# Patient Record
Sex: MALE | Race: WHITE | NOT HISPANIC OR LATINO | Employment: UNEMPLOYED | URBAN - METROPOLITAN AREA
[De-identification: names, ages, dates, MRNs, and addresses within clinical notes are randomized per-mention and may not be internally consistent; named-entity substitution may affect disease eponyms.]

---
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
## 2022-02-14 ENCOUNTER — PATIENT MESSAGE (OUTPATIENT)
Dept: RESEARCH | Facility: HOSPITAL | Age: 22
End: 2022-02-14
Payer: COMMERCIAL

## 2022-02-16 ENCOUNTER — PATIENT MESSAGE (OUTPATIENT)
Dept: RESEARCH | Facility: HOSPITAL | Age: 22
End: 2022-02-16
Payer: COMMERCIAL

## 2022-02-23 ENCOUNTER — HOSPITAL ENCOUNTER (INPATIENT)
Facility: HOSPITAL | Age: 22
LOS: 4 days | Discharge: HOME | DRG: 641 | End: 2022-02-27
Attending: EMERGENCY MEDICINE | Admitting: HOSPITALIST
Payer: COMMERCIAL

## 2022-02-23 DIAGNOSIS — R07.89 SENSATION OF CHEST TIGHTNESS: ICD-10-CM

## 2022-02-23 DIAGNOSIS — K92.1 BLOOD IN STOOL: ICD-10-CM

## 2022-02-23 DIAGNOSIS — R11.15 CYCLIC VOMITING SYNDROME: ICD-10-CM

## 2022-02-23 DIAGNOSIS — R00.0 TACHYCARDIA: ICD-10-CM

## 2022-02-23 DIAGNOSIS — R94.31 PROLONGED Q-T INTERVAL ON ECG: ICD-10-CM

## 2022-02-23 DIAGNOSIS — R11.10 VOMITING: ICD-10-CM

## 2022-02-23 DIAGNOSIS — R42 DIZZINESS: ICD-10-CM

## 2022-02-23 DIAGNOSIS — E87.6 HYPOKALEMIA: ICD-10-CM

## 2022-02-23 DIAGNOSIS — R06.02 SHORTNESS OF BREATH: ICD-10-CM

## 2022-02-23 DIAGNOSIS — F41.9 ANXIETY: ICD-10-CM

## 2022-02-23 DIAGNOSIS — R11.2 NON-INTRACTABLE VOMITING WITH NAUSEA, UNSPECIFIED VOMITING TYPE: Primary | ICD-10-CM

## 2022-02-23 PROBLEM — R79.89 ELEVATED TROPONIN: Status: ACTIVE | Noted: 2022-02-23

## 2022-02-23 LAB
ALBUMIN SERPL BCP-MCNC: 4.7 G/DL (ref 3.5–5.2)
ALP SERPL-CCNC: 112 U/L (ref 55–135)
ALT SERPL W/O P-5'-P-CCNC: 37 U/L (ref 10–44)
AMPHET+METHAMPHET UR QL: NEGATIVE
ANION GAP SERPL CALC-SCNC: 16 MMOL/L (ref 8–16)
ANION GAP SERPL CALC-SCNC: 17 MMOL/L (ref 8–16)
AST SERPL-CCNC: 40 U/L (ref 10–40)
BACTERIA #/AREA URNS AUTO: NORMAL /HPF
BARBITURATES UR QL SCN>200 NG/ML: NEGATIVE
BASOPHILS # BLD AUTO: 0.07 K/UL (ref 0–0.2)
BASOPHILS NFR BLD: 0.4 % (ref 0–1.9)
BENZODIAZ UR QL SCN>200 NG/ML: NEGATIVE
BILIRUB SERPL-MCNC: 1.8 MG/DL (ref 0.1–1)
BILIRUB UR QL STRIP: NEGATIVE
BNP SERPL-MCNC: <10 PG/ML (ref 0–99)
BUN SERPL-MCNC: 26 MG/DL (ref 6–20)
BUN SERPL-MCNC: 30 MG/DL (ref 6–20)
BZE UR QL SCN: NEGATIVE
CALCIUM SERPL-MCNC: 9 MG/DL (ref 8.7–10.5)
CALCIUM SERPL-MCNC: 9.6 MG/DL (ref 8.7–10.5)
CANNABINOIDS UR QL SCN: ABNORMAL
CHLORIDE SERPL-SCNC: 71 MMOL/L (ref 95–110)
CHLORIDE SERPL-SCNC: 75 MMOL/L (ref 95–110)
CLARITY UR REFRACT.AUTO: CLEAR
CO2 SERPL-SCNC: 37 MMOL/L (ref 23–29)
CO2 SERPL-SCNC: 39 MMOL/L (ref 23–29)
COLOR UR AUTO: YELLOW
CREAT SERPL-MCNC: 1.3 MG/DL (ref 0.5–1.4)
CREAT SERPL-MCNC: 1.3 MG/DL (ref 0.5–1.4)
CREAT UR-MCNC: 183 MG/DL (ref 23–375)
CRP SERPL-MCNC: 13.2 MG/L (ref 0–8.2)
CTP QC/QA: YES
D DIMER PPP IA.FEU-MCNC: 0.32 MG/L FEU
DIFFERENTIAL METHOD: ABNORMAL
EOSINOPHIL # BLD AUTO: 0 K/UL (ref 0–0.5)
EOSINOPHIL NFR BLD: 0 % (ref 0–8)
ERYTHROCYTE [DISTWIDTH] IN BLOOD BY AUTOMATED COUNT: 12.5 % (ref 11.5–14.5)
EST. GFR  (AFRICAN AMERICAN): >60 ML/MIN/1.73 M^2
EST. GFR  (AFRICAN AMERICAN): >60 ML/MIN/1.73 M^2
EST. GFR  (NON AFRICAN AMERICAN): >60 ML/MIN/1.73 M^2
EST. GFR  (NON AFRICAN AMERICAN): >60 ML/MIN/1.73 M^2
GLUCOSE SERPL-MCNC: 124 MG/DL (ref 70–110)
GLUCOSE SERPL-MCNC: 125 MG/DL (ref 70–110)
GLUCOSE UR QL STRIP: NEGATIVE
HCT VFR BLD AUTO: 49.7 % (ref 40–54)
HGB BLD-MCNC: 17.6 G/DL (ref 14–18)
HGB UR QL STRIP: NEGATIVE
HYALINE CASTS UR QL AUTO: 0 /LPF
IMM GRANULOCYTES # BLD AUTO: 0.19 K/UL (ref 0–0.04)
IMM GRANULOCYTES NFR BLD AUTO: 1 % (ref 0–0.5)
KETONES UR QL STRIP: NEGATIVE
LEUKOCYTE ESTERASE UR QL STRIP: NEGATIVE
LIPASE SERPL-CCNC: 19 U/L (ref 4–60)
LYMPHOCYTES # BLD AUTO: 1.8 K/UL (ref 1–4.8)
LYMPHOCYTES NFR BLD: 9.9 % (ref 18–48)
MAGNESIUM SERPL-MCNC: 3.3 MG/DL (ref 1.6–2.6)
MCH RBC QN AUTO: 29.5 PG (ref 27–31)
MCHC RBC AUTO-ENTMCNC: 35.4 G/DL (ref 32–36)
MCV RBC AUTO: 83 FL (ref 82–98)
METHADONE UR QL SCN>300 NG/ML: NEGATIVE
MICROSCOPIC COMMENT: NORMAL
MONOCYTES # BLD AUTO: 2 K/UL (ref 0.3–1)
MONOCYTES NFR BLD: 10.6 % (ref 4–15)
NEUTROPHILS # BLD AUTO: 14.5 K/UL (ref 1.8–7.7)
NEUTROPHILS NFR BLD: 78.1 % (ref 38–73)
NITRITE UR QL STRIP: NEGATIVE
NRBC BLD-RTO: 0 /100 WBC
OPIATES UR QL SCN: NEGATIVE
PCP UR QL SCN>25 NG/ML: NEGATIVE
PH UR STRIP: 7 [PH] (ref 5–8)
PLATELET # BLD AUTO: 399 K/UL (ref 150–450)
PMV BLD AUTO: 11.1 FL (ref 9.2–12.9)
POCT GLUCOSE: 130 MG/DL (ref 70–110)
POTASSIUM SERPL-SCNC: 2.2 MMOL/L (ref 3.5–5.1)
POTASSIUM SERPL-SCNC: 2.2 MMOL/L (ref 3.5–5.1)
PROT SERPL-MCNC: 8.1 G/DL (ref 6–8.4)
PROT UR QL STRIP: ABNORMAL
RBC # BLD AUTO: 5.97 M/UL (ref 4.6–6.2)
RBC #/AREA URNS AUTO: 2 /HPF (ref 0–4)
SARS-COV-2 RDRP RESP QL NAA+PROBE: NEGATIVE
SODIUM SERPL-SCNC: 126 MMOL/L (ref 136–145)
SODIUM SERPL-SCNC: 129 MMOL/L (ref 136–145)
SP GR UR STRIP: 1.02 (ref 1–1.03)
TOXICOLOGY INFORMATION: ABNORMAL
TROPONIN I SERPL DL<=0.01 NG/ML-MCNC: 0.26 NG/ML (ref 0–0.03)
TROPONIN I SERPL DL<=0.01 NG/ML-MCNC: 0.28 NG/ML (ref 0–0.03)
TROPONIN I SERPL DL<=0.01 NG/ML-MCNC: 0.33 NG/ML (ref 0–0.03)
TSH SERPL DL<=0.005 MIU/L-ACNC: 0.67 UIU/ML (ref 0.4–4)
URN SPEC COLLECT METH UR: ABNORMAL
WBC # BLD AUTO: 18.55 K/UL (ref 3.9–12.7)
WBC #/AREA URNS AUTO: 1 /HPF (ref 0–5)

## 2022-02-23 PROCEDURE — 86140 C-REACTIVE PROTEIN: CPT | Performed by: EMERGENCY MEDICINE

## 2022-02-23 PROCEDURE — 96366 THER/PROPH/DIAG IV INF ADDON: CPT

## 2022-02-23 PROCEDURE — 80048 BASIC METABOLIC PNL TOTAL CA: CPT | Performed by: HOSPITALIST

## 2022-02-23 PROCEDURE — 93010 EKG 12-LEAD: ICD-10-PCS | Mod: ,,, | Performed by: INTERNAL MEDICINE

## 2022-02-23 PROCEDURE — 63600175 PHARM REV CODE 636 W HCPCS: Performed by: EMERGENCY MEDICINE

## 2022-02-23 PROCEDURE — 93010 ELECTROCARDIOGRAM REPORT: CPT | Mod: ,,, | Performed by: INTERNAL MEDICINE

## 2022-02-23 PROCEDURE — 96375 TX/PRO/DX INJ NEW DRUG ADDON: CPT

## 2022-02-23 PROCEDURE — 99285 EMERGENCY DEPT VISIT HI MDM: CPT | Mod: 25

## 2022-02-23 PROCEDURE — 85025 COMPLETE CBC W/AUTO DIFF WBC: CPT | Performed by: EMERGENCY MEDICINE

## 2022-02-23 PROCEDURE — 99284 EMERGENCY DEPT VISIT MOD MDM: CPT | Mod: CS,,, | Performed by: EMERGENCY MEDICINE

## 2022-02-23 PROCEDURE — 84484 ASSAY OF TROPONIN QUANT: CPT | Performed by: EMERGENCY MEDICINE

## 2022-02-23 PROCEDURE — 96361 HYDRATE IV INFUSION ADD-ON: CPT | Mod: 59

## 2022-02-23 PROCEDURE — 99223 PR INITIAL HOSPITAL CARE,LEVL III: ICD-10-PCS | Mod: ,,, | Performed by: HOSPITALIST

## 2022-02-23 PROCEDURE — 83735 ASSAY OF MAGNESIUM: CPT | Performed by: HOSPITALIST

## 2022-02-23 PROCEDURE — 96374 THER/PROPH/DIAG INJ IV PUSH: CPT | Mod: 59

## 2022-02-23 PROCEDURE — 84443 ASSAY THYROID STIM HORMONE: CPT | Performed by: EMERGENCY MEDICINE

## 2022-02-23 PROCEDURE — 80307 DRUG TEST PRSMV CHEM ANLYZR: CPT | Performed by: EMERGENCY MEDICINE

## 2022-02-23 PROCEDURE — 12000002 HC ACUTE/MED SURGE SEMI-PRIVATE ROOM

## 2022-02-23 PROCEDURE — 99223 1ST HOSP IP/OBS HIGH 75: CPT | Mod: ,,, | Performed by: HOSPITALIST

## 2022-02-23 PROCEDURE — 99284 PR EMERGENCY DEPT VISIT,LEVEL IV: ICD-10-PCS | Mod: CS,,, | Performed by: EMERGENCY MEDICINE

## 2022-02-23 PROCEDURE — 84484 ASSAY OF TROPONIN QUANT: CPT | Mod: 91 | Performed by: EMERGENCY MEDICINE

## 2022-02-23 PROCEDURE — 96365 THER/PROPH/DIAG IV INF INIT: CPT

## 2022-02-23 PROCEDURE — 85379 FIBRIN DEGRADATION QUANT: CPT | Performed by: EMERGENCY MEDICINE

## 2022-02-23 PROCEDURE — 83690 ASSAY OF LIPASE: CPT | Performed by: EMERGENCY MEDICINE

## 2022-02-23 PROCEDURE — 84484 ASSAY OF TROPONIN QUANT: CPT | Mod: 91 | Performed by: HOSPITALIST

## 2022-02-23 PROCEDURE — 83880 ASSAY OF NATRIURETIC PEPTIDE: CPT | Performed by: EMERGENCY MEDICINE

## 2022-02-23 PROCEDURE — U0002 COVID-19 LAB TEST NON-CDC: HCPCS | Performed by: STUDENT IN AN ORGANIZED HEALTH CARE EDUCATION/TRAINING PROGRAM

## 2022-02-23 PROCEDURE — 93005 ELECTROCARDIOGRAM TRACING: CPT

## 2022-02-23 PROCEDURE — 80053 COMPREHEN METABOLIC PANEL: CPT | Performed by: EMERGENCY MEDICINE

## 2022-02-23 PROCEDURE — 82962 GLUCOSE BLOOD TEST: CPT

## 2022-02-23 PROCEDURE — 25000003 PHARM REV CODE 250: Performed by: EMERGENCY MEDICINE

## 2022-02-23 PROCEDURE — 63600175 PHARM REV CODE 636 W HCPCS: Performed by: HOSPITALIST

## 2022-02-23 PROCEDURE — 81001 URINALYSIS AUTO W/SCOPE: CPT | Performed by: EMERGENCY MEDICINE

## 2022-02-23 RX ORDER — IBUPROFEN 200 MG
24 TABLET ORAL
Status: DISCONTINUED | OUTPATIENT
Start: 2022-02-23 | End: 2022-02-27 | Stop reason: HOSPADM

## 2022-02-23 RX ORDER — IPRATROPIUM BROMIDE AND ALBUTEROL SULFATE 2.5; .5 MG/3ML; MG/3ML
3 SOLUTION RESPIRATORY (INHALATION) EVERY 6 HOURS PRN
Status: DISCONTINUED | OUTPATIENT
Start: 2022-02-23 | End: 2022-02-23

## 2022-02-23 RX ORDER — LORAZEPAM 2 MG/ML
1 INJECTION INTRAMUSCULAR ONCE
Status: COMPLETED | OUTPATIENT
Start: 2022-02-23 | End: 2022-02-23

## 2022-02-23 RX ORDER — MAG HYDROX/ALUMINUM HYD/SIMETH 200-200-20
30 SUSPENSION, ORAL (FINAL DOSE FORM) ORAL ONCE
Status: COMPLETED | OUTPATIENT
Start: 2022-02-23 | End: 2022-02-23

## 2022-02-23 RX ORDER — POTASSIUM CHLORIDE 750 MG/1
40 CAPSULE, EXTENDED RELEASE ORAL ONCE
Status: COMPLETED | OUTPATIENT
Start: 2022-02-24 | End: 2022-02-23

## 2022-02-23 RX ORDER — ACETAMINOPHEN 325 MG/1
650 TABLET ORAL EVERY 4 HOURS PRN
Status: DISCONTINUED | OUTPATIENT
Start: 2022-02-23 | End: 2022-02-27 | Stop reason: HOSPADM

## 2022-02-23 RX ORDER — IPRATROPIUM BROMIDE AND ALBUTEROL SULFATE 2.5; .5 MG/3ML; MG/3ML
3 SOLUTION RESPIRATORY (INHALATION) EVERY 6 HOURS PRN
Status: DISCONTINUED | OUTPATIENT
Start: 2022-02-23 | End: 2022-02-27 | Stop reason: HOSPADM

## 2022-02-23 RX ORDER — PROCHLORPERAZINE EDISYLATE 5 MG/ML
5 INJECTION INTRAMUSCULAR; INTRAVENOUS EVERY 6 HOURS PRN
Status: DISCONTINUED | OUTPATIENT
Start: 2022-02-23 | End: 2022-02-23

## 2022-02-23 RX ORDER — IBUPROFEN 200 MG
16 TABLET ORAL
Status: DISCONTINUED | OUTPATIENT
Start: 2022-02-23 | End: 2022-02-23

## 2022-02-23 RX ORDER — NALOXONE HCL 0.4 MG/ML
0.02 VIAL (ML) INJECTION
Status: DISCONTINUED | OUTPATIENT
Start: 2022-02-23 | End: 2022-02-27 | Stop reason: HOSPADM

## 2022-02-23 RX ORDER — SODIUM CHLORIDE 0.9 % (FLUSH) 0.9 %
10 SYRINGE (ML) INJECTION
Status: DISCONTINUED | OUTPATIENT
Start: 2022-02-23 | End: 2022-02-23

## 2022-02-23 RX ORDER — POTASSIUM CHLORIDE 7.45 MG/ML
10 INJECTION INTRAVENOUS
Status: COMPLETED | OUTPATIENT
Start: 2022-02-23 | End: 2022-02-23

## 2022-02-23 RX ORDER — POTASSIUM CHLORIDE 7.45 MG/ML
10 INJECTION INTRAVENOUS ONCE
Status: DISCONTINUED | OUTPATIENT
Start: 2022-02-23 | End: 2022-02-23

## 2022-02-23 RX ORDER — SODIUM CHLORIDE 9 MG/ML
INJECTION, SOLUTION INTRAVENOUS CONTINUOUS
Status: DISCONTINUED | OUTPATIENT
Start: 2022-02-24 | End: 2022-02-24

## 2022-02-23 RX ORDER — SODIUM CHLORIDE AND POTASSIUM CHLORIDE 150; 900 MG/100ML; MG/100ML
INJECTION, SOLUTION INTRAVENOUS CONTINUOUS
Status: DISCONTINUED | OUTPATIENT
Start: 2022-02-23 | End: 2022-02-23

## 2022-02-23 RX ORDER — TALC
6 POWDER (GRAM) TOPICAL NIGHTLY PRN
Status: DISCONTINUED | OUTPATIENT
Start: 2022-02-23 | End: 2022-02-23

## 2022-02-23 RX ORDER — LORAZEPAM 2 MG/ML
1 INJECTION INTRAMUSCULAR
Status: COMPLETED | OUTPATIENT
Start: 2022-02-23 | End: 2022-02-23

## 2022-02-23 RX ORDER — FAMOTIDINE 10 MG/ML
20 INJECTION INTRAVENOUS
Status: COMPLETED | OUTPATIENT
Start: 2022-02-23 | End: 2022-02-23

## 2022-02-23 RX ORDER — QUETIAPINE FUMARATE 200 MG/1
200 TABLET, FILM COATED ORAL NIGHTLY
COMMUNITY

## 2022-02-23 RX ORDER — ACETAMINOPHEN 325 MG/1
650 TABLET ORAL EVERY 4 HOURS PRN
Status: DISCONTINUED | OUTPATIENT
Start: 2022-02-23 | End: 2022-02-23

## 2022-02-23 RX ORDER — ENOXAPARIN SODIUM 100 MG/ML
40 INJECTION SUBCUTANEOUS EVERY 24 HOURS
Status: DISCONTINUED | OUTPATIENT
Start: 2022-02-23 | End: 2022-02-25

## 2022-02-23 RX ORDER — IBUPROFEN 200 MG
16 TABLET ORAL
Status: DISCONTINUED | OUTPATIENT
Start: 2022-02-23 | End: 2022-02-27 | Stop reason: HOSPADM

## 2022-02-23 RX ORDER — NALOXONE HCL 0.4 MG/ML
0.02 VIAL (ML) INJECTION
Status: DISCONTINUED | OUTPATIENT
Start: 2022-02-23 | End: 2022-02-23

## 2022-02-23 RX ORDER — TALC
6 POWDER (GRAM) TOPICAL NIGHTLY PRN
Status: DISCONTINUED | OUTPATIENT
Start: 2022-02-23 | End: 2022-02-27 | Stop reason: HOSPADM

## 2022-02-23 RX ORDER — IBUPROFEN 200 MG
24 TABLET ORAL
Status: DISCONTINUED | OUTPATIENT
Start: 2022-02-23 | End: 2022-02-23

## 2022-02-23 RX ORDER — MAGNESIUM SULFATE HEPTAHYDRATE 40 MG/ML
2 INJECTION, SOLUTION INTRAVENOUS
Status: COMPLETED | OUTPATIENT
Start: 2022-02-23 | End: 2022-02-23

## 2022-02-23 RX ORDER — SERTRALINE HYDROCHLORIDE 100 MG/1
100 TABLET, FILM COATED ORAL DAILY
COMMUNITY

## 2022-02-23 RX ORDER — POTASSIUM CHLORIDE 7.45 MG/ML
10 INJECTION INTRAVENOUS
Status: DISPENSED | OUTPATIENT
Start: 2022-02-23 | End: 2022-02-24

## 2022-02-23 RX ORDER — SODIUM CHLORIDE 0.9 % (FLUSH) 0.9 %
10 SYRINGE (ML) INJECTION
Status: DISCONTINUED | OUTPATIENT
Start: 2022-02-23 | End: 2022-02-27 | Stop reason: HOSPADM

## 2022-02-23 RX ORDER — ONDANSETRON 2 MG/ML
4 INJECTION INTRAMUSCULAR; INTRAVENOUS EVERY 8 HOURS PRN
Status: DISCONTINUED | OUTPATIENT
Start: 2022-02-23 | End: 2022-02-23

## 2022-02-23 RX ORDER — SERTRALINE HYDROCHLORIDE 100 MG/1
100 TABLET, FILM COATED ORAL DAILY
Status: DISCONTINUED | OUTPATIENT
Start: 2022-02-24 | End: 2022-02-27 | Stop reason: HOSPADM

## 2022-02-23 RX ORDER — LIDOCAINE HYDROCHLORIDE 20 MG/ML
10 SOLUTION OROPHARYNGEAL ONCE
Status: COMPLETED | OUTPATIENT
Start: 2022-02-23 | End: 2022-02-23

## 2022-02-23 RX ORDER — LORAZEPAM 2 MG/ML
1 INJECTION INTRAMUSCULAR EVERY 4 HOURS PRN
Status: DISCONTINUED | OUTPATIENT
Start: 2022-02-23 | End: 2022-02-27 | Stop reason: HOSPADM

## 2022-02-23 RX ADMIN — FAMOTIDINE 20 MG: 10 INJECTION INTRAVENOUS at 02:02

## 2022-02-23 RX ADMIN — SODIUM CHLORIDE: 0.9 INJECTION, SOLUTION INTRAVENOUS at 11:02

## 2022-02-23 RX ADMIN — LIDOCAINE HYDROCHLORIDE 10 ML: 20 SOLUTION ORAL; TOPICAL at 02:02

## 2022-02-23 RX ADMIN — POTASSIUM CHLORIDE 40 MEQ: 10 CAPSULE, COATED, EXTENDED RELEASE ORAL at 11:02

## 2022-02-23 RX ADMIN — SODIUM CHLORIDE 1000 ML: 0.9 INJECTION, SOLUTION INTRAVENOUS at 12:02

## 2022-02-23 RX ADMIN — POTASSIUM CHLORIDE 10 MEQ: 7.46 INJECTION, SOLUTION INTRAVENOUS at 11:02

## 2022-02-23 RX ADMIN — MAGNESIUM SULFATE IN WATER 2 G: 40 INJECTION, SOLUTION INTRAVENOUS at 04:02

## 2022-02-23 RX ADMIN — ALUMINUM HYDROXIDE, MAGNESIUM HYDROXIDE, AND SIMETHICONE 30 ML: 200; 200; 20 SUSPENSION ORAL at 02:02

## 2022-02-23 RX ADMIN — POTASSIUM CHLORIDE 10 MEQ: 7.46 INJECTION, SOLUTION INTRAVENOUS at 07:02

## 2022-02-23 RX ADMIN — LORAZEPAM 1 MG: 2 INJECTION INTRAMUSCULAR; INTRAVENOUS at 10:02

## 2022-02-23 RX ADMIN — ENOXAPARIN SODIUM 40 MG: 40 INJECTION SUBCUTANEOUS at 10:02

## 2022-02-23 RX ADMIN — POTASSIUM CHLORIDE 10 MEQ: 7.46 INJECTION, SOLUTION INTRAVENOUS at 10:02

## 2022-02-23 RX ADMIN — LORAZEPAM 1 MG: 2 INJECTION INTRAMUSCULAR; INTRAVENOUS at 12:02

## 2022-02-23 RX ADMIN — LORAZEPAM 1 MG: 2 INJECTION INTRAMUSCULAR; INTRAVENOUS at 01:02

## 2022-02-23 NOTE — PROVIDER PROGRESS NOTES - EMERGENCY DEPT.
Encounter Date: 2/23/2022    ED Physician Progress Notes        Physician Note:   1635: Discussed Electrolyte abnormalities (K 2.2, Cl 71, BUN 35 , CO2 39 with patient and Mother. Will give KCl bolus and Magnesium IVPB. Will continue hydration. BNP and D Dimer grossly normal. Cardiology planning to evaluate patient regarding elevated Troponin per shift change check out discussion.  Remains stable with continued sensation of substernal pressure and sensation of chest tightness with breathing. Lung fields clear. Patient continue to report anxiety although has decreased somewhat. Continues mildly tachypneic.  Ultrasound of RUQ and RLQ grossly normal.     1830: Spoke with Cardiology Fellow who recommends admission for observation with trending troponin and obtain echo.

## 2022-02-23 NOTE — ED PROVIDER NOTES
Encounter Date: 2/23/2022       History     Chief Complaint   Patient presents with    Panic Attack     21-year-old male with PMH of cyclical vomiting, prolonged QT syndrome, presents with multiple complaints including anxiety and abdominal pain.  The pain has been present 6 days, acute constant, diffuse aggravated with associated frequent episodes of nonbloody, nonbilious vomiting and no stool output for 6 days.  Patient has tolerated p.o. fluids but not solids over the past 6 days.  He also reports he is having an anxiety attack feels very nervous and short of breath currently.  He states that shortness of breath he has been having has been present for several days, he states that is not typical for his episodes of cyclic vomiting and is different than his typical anxiety as well.  He reported a short episode of blurred vision during medical student evaluation but then resolved, has normal visual acuity on testing. He has been unable to take his Zoloft and Seroquel the past 6 days because his p.o. intolerance.  He reports similar vomiting episodes to this in the past but the shortness of breath is different.  Patient reports a history marijuana, cocaine, and opiate abuse but states he has not used cocaine or opiates in the past 3-4 months.  He last smoked marijuana 2 weeks ago.  He denies fever, chills, congestion, chest pain diarrhea, dysuria, hematuria, and penile discharge. He has never had an STD. He has a psychiatrist/therapist, and they have discussed starting xanax PRN. Reports hx of SI several months ago but denies active SI and HI and denies history of suicide attempts.  He states he has been seeing his therapist for this and it is helping.    He has had headache that started several days after the vomiting, he states this is typical for his vomiting episodes. He denies chest pain.    The history is provided by the patient.     Review of patient's allergies indicates:  No Known Allergies  Past Medical  History:   Diagnosis Date    Abdominal migraine     Acquired pneumomediastinum     secondary to vomiting    Cyclical vomiting      History reviewed. No pertinent surgical history.  Family History   Problem Relation Age of Onset    Migraines Father     Bipolar disorder Father      Social History     Tobacco Use    Smoking status: Current Some Day Smoker    Smokeless tobacco: Never Used   Substance Use Topics    Alcohol use: Yes    Drug use: Yes     Types: Marijuana     Review of Systems   Constitutional: Negative for chills and fever.   HENT: Negative for congestion and sore throat.    Eyes: Positive for visual disturbance.   Respiratory: Positive for shortness of breath. Negative for cough.    Cardiovascular: Negative for chest pain and leg swelling.   Gastrointestinal: Positive for abdominal pain, constipation, nausea and vomiting. Negative for diarrhea.   Endocrine: Negative for polyuria.   Genitourinary: Negative for dysuria, hematuria, penile discharge and penile swelling.   Musculoskeletal: Negative for arthralgias and back pain.   Skin: Negative for color change and rash.   Neurological: Positive for headaches (frontal). Negative for dizziness and syncope.       Physical Exam     Initial Vitals [02/23/22 1110]   BP Pulse Resp Temp SpO2   (!) 130/95 107 18 98.5 °F (36.9 °C) 100 %      MAP       --         Physical Exam    Nursing note and vitals reviewed.  Constitutional: He appears well-developed and well-nourished. He is not diaphoretic. No distress.   Patient resting in bed, appears slightly anxious but is in no acute distress   HENT:   Head: Normocephalic and atraumatic.   Eyes: Conjunctivae and EOM are normal. Pupils are equal, round, and reactive to light.   Neck: Neck supple.   Normal range of motion.  Cardiovascular: Regular rhythm, normal heart sounds and intact distal pulses.   No murmur heard.  tachycardic   Pulmonary/Chest: Breath sounds normal. No respiratory distress. He has no wheezes.  He has no rhonchi. He has no rales.   Intermittent increased work of breathing and tachypnea. CTAB   Abdominal: Abdomen is soft.   Non-distended abdomen that is slightly firm. Diffuse TTP that is worse in the b/l lower quadrants. No pain with leg raise There is no rebound and no guarding.   Musculoskeletal:         General: No tenderness or edema.      Cervical back: Normal range of motion and neck supple.     Neurological: He is alert and oriented to person, place, and time.   Skin: Skin is warm and dry. Capillary refill takes less than 2 seconds.   Psychiatric:   Mildly anxious appearing. Not hyperactive, normal speech, normal memory. Denies SI and HI.          ED Course   Procedures  Labs Reviewed   TROPONIN I   COMPREHENSIVE METABOLIC PANEL   LIPASE   DRUG SCREEN PANEL, URINE EMERGENCY   URINALYSIS, REFLEX TO URINE CULTURE   TSH   CBC W/ AUTO DIFFERENTIAL          Imaging Results    None          Medications   sodium chloride 0.9% bolus 1,000 mL (has no administration in time range)   lorazepam injection 1 mg (has no administration in time range)     Medical Decision Making:   Initial Assessment:   20 yo male with PMH of cyclical vomiting and polysubstance abuse presents with anxiety and abdominal pain x6 days associated with vomiting, afebrile and hemodynamically stable but tachycardic, PE shows anxious appearing male with diffuse TTP. Will obtain labs and give fluids.   Differential Diagnosis:   Cyclical vomiting, cannabis hyperemesis, electrolyte abnormality, anxiety, panic attack, drug use, PE, appendicitis, SBO, pancreatitis, biliary pathology, DKA  Clinical Tests:   Lab Tests: Ordered  Medical Tests: Ordered  ED Management:  Labs and treatment still pending. Attending MD solely took over care at 12:30pm. See attending attestation for additional information.             Attending Attestation:   Physician Attestation Statement for Resident:  As the supervising MD   Physician Attestation Statement: I have  personally seen and examined this patient.   I agree with the above history. -:   As the supervising MD I agree with the above PE.    As the supervising MD I agree with the above treatment, course, plan, and disposition.   -: CBC shows significantly elevated white blood cell count.  Given this and right lower quadrant tenderness ordered a ultrasound of the right lower quadrant to assess for appendicitis.  Bedside ultrasound negative for significant pericardial effusion, EKG suggestive of pericarditis versus benign early repolarization, he does not have typical chest pain for pericarditis. At this time think pericarditis is less likely. Troponin and BNP are pending.  CMP and lipase are pending because of initial hemolyzed labs.  Ordered a point of care glucose and ordered D-dimer as well to assess for possibility of pulmonary embolism given that he has shortness of breath and tachycardia.  He was feeling better in terms of his nausea after receiving Ativan and Benadryl.  Anxiety is also feeling much better though he is still breathing relatively quickly.  Chest x-ray negative.  Abdominal x-ray negative for obstruction.  Patient signed out to the oncoming physician Dr. Randall to follow up on labs, US, reassess.  Consider CTA chest for PE if D-dimer is elevated.  Consider CT abdomen if needed.                         Clinical Impression:   Final diagnoses:  [R00.0] Tachycardia  [F41.9] Anxiety  [R11.2] Non-intractable vomiting with nausea, unspecified vomiting type (Primary)                 Maximiliano Navarro MD  Resident  02/23/22 [id number]       Geovanny [facility] MD  02/23/22 [id number]

## 2022-02-24 LAB
ANION GAP SERPL CALC-SCNC: 11 MMOL/L (ref 8–16)
ANION GAP SERPL CALC-SCNC: 16 MMOL/L (ref 8–16)
ASCENDING AORTA: 3.17 CM
AV INDEX (PROSTH): 1.14
AV MEAN GRADIENT: 2 MMHG
AV PEAK GRADIENT: 3 MMHG
AV VALVE AREA: 4.5 CM2
AV VELOCITY RATIO: 1.09
BASOPHILS # BLD AUTO: 0.05 K/UL (ref 0–0.2)
BASOPHILS NFR BLD: 0.4 % (ref 0–1.9)
BSA FOR ECHO PROCEDURE: 2.2 M2
BUN SERPL-MCNC: 20 MG/DL (ref 6–20)
BUN SERPL-MCNC: 24 MG/DL (ref 6–20)
CALCIUM SERPL-MCNC: 8.7 MG/DL (ref 8.7–10.5)
CALCIUM SERPL-MCNC: 9.3 MG/DL (ref 8.7–10.5)
CHLORIDE SERPL-SCNC: 80 MMOL/L (ref 95–110)
CHLORIDE SERPL-SCNC: 82 MMOL/L (ref 95–110)
CO2 SERPL-SCNC: 34 MMOL/L (ref 23–29)
CO2 SERPL-SCNC: 39 MMOL/L (ref 23–29)
CREAT SERPL-MCNC: 1.1 MG/DL (ref 0.5–1.4)
CREAT SERPL-MCNC: 1.2 MG/DL (ref 0.5–1.4)
CV ECHO LV RWT: 0.38 CM
DIFFERENTIAL METHOD: ABNORMAL
DOP CALC AO PEAK VEL: 0.93 M/S
DOP CALC AO VTI: 16.13 CM
DOP CALC LVOT AREA: 3.9 CM2
DOP CALC LVOT DIAMETER: 2.24 CM
DOP CALC LVOT PEAK VEL: 1.01 M/S
DOP CALC LVOT STROKE VOLUME: 72.63 CM3
DOP CALCLVOT PEAK VEL VTI: 18.44 CM
E WAVE DECELERATION TIME: 120.93 MSEC
E/A RATIO: 1.59
E/E' RATIO: 4.71 M/S
ECHO LV POSTERIOR WALL: 0.99 CM (ref 0.6–1.1)
EJECTION FRACTION: 65 %
EOSINOPHIL # BLD AUTO: 0 K/UL (ref 0–0.5)
EOSINOPHIL NFR BLD: 0.1 % (ref 0–8)
ERYTHROCYTE [DISTWIDTH] IN BLOOD BY AUTOMATED COUNT: 12.4 % (ref 11.5–14.5)
EST. GFR  (AFRICAN AMERICAN): >60 ML/MIN/1.73 M^2
EST. GFR  (AFRICAN AMERICAN): >60 ML/MIN/1.73 M^2
EST. GFR  (NON AFRICAN AMERICAN): >60 ML/MIN/1.73 M^2
EST. GFR  (NON AFRICAN AMERICAN): >60 ML/MIN/1.73 M^2
FRACTIONAL SHORTENING: 36 % (ref 28–44)
GLUCOSE SERPL-MCNC: 124 MG/DL (ref 70–110)
GLUCOSE SERPL-MCNC: 125 MG/DL (ref 70–110)
HCT VFR BLD AUTO: 42.2 % (ref 40–54)
HGB BLD-MCNC: 14.9 G/DL (ref 14–18)
IMM GRANULOCYTES # BLD AUTO: 0.13 K/UL (ref 0–0.04)
IMM GRANULOCYTES NFR BLD AUTO: 1 % (ref 0–0.5)
INTERVENTRICULAR SEPTUM: 0.84 CM (ref 0.6–1.1)
LA MAJOR: 4.91 CM
LA MINOR: 3.77 CM
LA WIDTH: 3.14 CM
LEFT ATRIUM SIZE: 3.29 CM
LEFT ATRIUM VOLUME INDEX MOD: 16.1 ML/M2
LEFT ATRIUM VOLUME INDEX: 17.2 ML/M2
LEFT ATRIUM VOLUME MOD: 35.09 CM3
LEFT ATRIUM VOLUME: 37.45 CM3
LEFT INTERNAL DIMENSION IN SYSTOLE: 3.37 CM (ref 2.1–4)
LEFT VENTRICLE DIASTOLIC VOLUME INDEX: 61.2 ML/M2
LEFT VENTRICLE DIASTOLIC VOLUME: 133.41 ML
LEFT VENTRICLE MASS INDEX: 81 G/M2
LEFT VENTRICLE SYSTOLIC VOLUME INDEX: 21.3 ML/M2
LEFT VENTRICLE SYSTOLIC VOLUME: 46.35 ML
LEFT VENTRICULAR INTERNAL DIMENSION IN DIASTOLE: 5.27 CM (ref 3.5–6)
LEFT VENTRICULAR MASS: 176.61 G
LV LATERAL E/E' RATIO: 4.87 M/S
LV SEPTAL E/E' RATIO: 4.56 M/S
LYMPHOCYTES # BLD AUTO: 2.2 K/UL (ref 1–4.8)
LYMPHOCYTES NFR BLD: 15.9 % (ref 18–48)
MAGNESIUM SERPL-MCNC: 2.7 MG/DL (ref 1.6–2.6)
MAGNESIUM SERPL-MCNC: 3.1 MG/DL (ref 1.6–2.6)
MCH RBC QN AUTO: 30.2 PG (ref 27–31)
MCHC RBC AUTO-ENTMCNC: 35.3 G/DL (ref 32–36)
MCV RBC AUTO: 85 FL (ref 82–98)
MONOCYTES # BLD AUTO: 1.5 K/UL (ref 0.3–1)
MONOCYTES NFR BLD: 11.1 % (ref 4–15)
MV PEAK A VEL: 0.46 M/S
MV PEAK E VEL: 0.73 M/S
MV STENOSIS PRESSURE HALF TIME: 35.07 MS
MV VALVE AREA P 1/2 METHOD: 6.27 CM2
NEUTROPHILS # BLD AUTO: 9.8 K/UL (ref 1.8–7.7)
NEUTROPHILS NFR BLD: 71.5 % (ref 38–73)
NRBC BLD-RTO: 0 /100 WBC
PHOSPHATE SERPL-MCNC: 1.6 MG/DL (ref 2.7–4.5)
PLATELET # BLD AUTO: 314 K/UL (ref 150–450)
PMV BLD AUTO: 10.8 FL (ref 9.2–12.9)
POTASSIUM SERPL-SCNC: 2.7 MMOL/L (ref 3.5–5.1)
POTASSIUM SERPL-SCNC: 3 MMOL/L (ref 3.5–5.1)
RA MAJOR: 3.66 CM
RA PRESSURE: 3 MMHG
RA WIDTH: 3.01 CM
RBC # BLD AUTO: 4.94 M/UL (ref 4.6–6.2)
RIGHT VENTRICULAR END-DIASTOLIC DIMENSION: 1.91 CM
RV TISSUE DOPPLER FREE WALL SYSTOLIC VELOCITY 1 (APICAL 4 CHAMBER VIEW): 10.66 CM/S
SINUS: 3.57 CM
SODIUM SERPL-SCNC: 130 MMOL/L (ref 136–145)
SODIUM SERPL-SCNC: 132 MMOL/L (ref 136–145)
STJ: 2.92 CM
TDI LATERAL: 0.15 M/S
TDI SEPTAL: 0.16 M/S
TDI: 0.16 M/S
TROPONIN I SERPL DL<=0.01 NG/ML-MCNC: 0.34 NG/ML (ref 0–0.03)
WBC # BLD AUTO: 13.66 K/UL (ref 3.9–12.7)

## 2022-02-24 PROCEDURE — 20600001 HC STEP DOWN PRIVATE ROOM

## 2022-02-24 PROCEDURE — 63600175 PHARM REV CODE 636 W HCPCS: Performed by: HOSPITALIST

## 2022-02-24 PROCEDURE — 94761 N-INVAS EAR/PLS OXIMETRY MLT: CPT

## 2022-02-24 PROCEDURE — 84484 ASSAY OF TROPONIN QUANT: CPT | Performed by: STUDENT IN AN ORGANIZED HEALTH CARE EDUCATION/TRAINING PROGRAM

## 2022-02-24 PROCEDURE — 99233 PR SUBSEQUENT HOSPITAL CARE,LEVL III: ICD-10-PCS | Mod: ,,, | Performed by: STUDENT IN AN ORGANIZED HEALTH CARE EDUCATION/TRAINING PROGRAM

## 2022-02-24 PROCEDURE — 85025 COMPLETE CBC W/AUTO DIFF WBC: CPT | Performed by: STUDENT IN AN ORGANIZED HEALTH CARE EDUCATION/TRAINING PROGRAM

## 2022-02-24 PROCEDURE — 83735 ASSAY OF MAGNESIUM: CPT | Performed by: STUDENT IN AN ORGANIZED HEALTH CARE EDUCATION/TRAINING PROGRAM

## 2022-02-24 PROCEDURE — 25000003 PHARM REV CODE 250: Performed by: HOSPITALIST

## 2022-02-24 PROCEDURE — 83735 ASSAY OF MAGNESIUM: CPT | Mod: 91 | Performed by: STUDENT IN AN ORGANIZED HEALTH CARE EDUCATION/TRAINING PROGRAM

## 2022-02-24 PROCEDURE — 36415 COLL VENOUS BLD VENIPUNCTURE: CPT | Performed by: STUDENT IN AN ORGANIZED HEALTH CARE EDUCATION/TRAINING PROGRAM

## 2022-02-24 PROCEDURE — 63600175 PHARM REV CODE 636 W HCPCS: Performed by: STUDENT IN AN ORGANIZED HEALTH CARE EDUCATION/TRAINING PROGRAM

## 2022-02-24 PROCEDURE — 93005 ELECTROCARDIOGRAM TRACING: CPT

## 2022-02-24 PROCEDURE — 93010 ELECTROCARDIOGRAM REPORT: CPT | Mod: ,,, | Performed by: INTERNAL MEDICINE

## 2022-02-24 PROCEDURE — 99233 SBSQ HOSP IP/OBS HIGH 50: CPT | Mod: ,,, | Performed by: STUDENT IN AN ORGANIZED HEALTH CARE EDUCATION/TRAINING PROGRAM

## 2022-02-24 PROCEDURE — 84100 ASSAY OF PHOSPHORUS: CPT | Performed by: STUDENT IN AN ORGANIZED HEALTH CARE EDUCATION/TRAINING PROGRAM

## 2022-02-24 PROCEDURE — 80048 BASIC METABOLIC PNL TOTAL CA: CPT | Performed by: STUDENT IN AN ORGANIZED HEALTH CARE EDUCATION/TRAINING PROGRAM

## 2022-02-24 PROCEDURE — 93010 EKG 12-LEAD: ICD-10-PCS | Mod: ,,, | Performed by: INTERNAL MEDICINE

## 2022-02-24 PROCEDURE — 80048 BASIC METABOLIC PNL TOTAL CA: CPT | Mod: 91 | Performed by: STUDENT IN AN ORGANIZED HEALTH CARE EDUCATION/TRAINING PROGRAM

## 2022-02-24 RX ORDER — SODIUM CHLORIDE 9 MG/ML
INJECTION, SOLUTION INTRAVENOUS CONTINUOUS
Status: ACTIVE | OUTPATIENT
Start: 2022-02-24 | End: 2022-02-25

## 2022-02-24 RX ORDER — POTASSIUM CHLORIDE 7.45 MG/ML
10 INJECTION INTRAVENOUS
Status: COMPLETED | OUTPATIENT
Start: 2022-02-24 | End: 2022-02-24

## 2022-02-24 RX ORDER — POTASSIUM CHLORIDE 7.45 MG/ML
10 INJECTION INTRAVENOUS
Status: DISPENSED | OUTPATIENT
Start: 2022-02-24 | End: 2022-02-25

## 2022-02-24 RX ORDER — MAG HYDROX/ALUMINUM HYD/SIMETH 200-200-20
30 SUSPENSION, ORAL (FINAL DOSE FORM) ORAL EVERY 6 HOURS PRN
Status: DISCONTINUED | OUTPATIENT
Start: 2022-02-25 | End: 2022-02-27 | Stop reason: HOSPADM

## 2022-02-24 RX ADMIN — ENOXAPARIN SODIUM 40 MG: 40 INJECTION SUBCUTANEOUS at 05:02

## 2022-02-24 RX ADMIN — POTASSIUM CHLORIDE 10 MEQ: 7.46 INJECTION, SOLUTION INTRAVENOUS at 05:02

## 2022-02-24 RX ADMIN — POTASSIUM CHLORIDE 10 MEQ: 10 INJECTION, SOLUTION INTRAVENOUS at 11:02

## 2022-02-24 RX ADMIN — LORAZEPAM 1 MG: 2 INJECTION INTRAMUSCULAR; INTRAVENOUS at 03:02

## 2022-02-24 RX ADMIN — ALUMINUM HYDROXIDE, MAGNESIUM HYDROXIDE, AND SIMETHICONE 30 ML: 200; 200; 20 SUSPENSION ORAL at 11:02

## 2022-02-24 RX ADMIN — POTASSIUM CHLORIDE 10 MEQ: 10 INJECTION, SOLUTION INTRAVENOUS at 10:02

## 2022-02-24 RX ADMIN — POTASSIUM CHLORIDE 10 MEQ: 7.46 INJECTION, SOLUTION INTRAVENOUS at 03:02

## 2022-02-24 RX ADMIN — POTASSIUM CHLORIDE 10 MEQ: 7.46 INJECTION, SOLUTION INTRAVENOUS at 10:02

## 2022-02-24 RX ADMIN — POTASSIUM CHLORIDE 10 MEQ: 7.46 INJECTION, SOLUTION INTRAVENOUS at 08:02

## 2022-02-24 RX ADMIN — POTASSIUM CHLORIDE 10 MEQ: 7.46 INJECTION, SOLUTION INTRAVENOUS at 01:02

## 2022-02-24 RX ADMIN — POTASSIUM CHLORIDE 10 MEQ: 10 INJECTION, SOLUTION INTRAVENOUS at 12:02

## 2022-02-24 RX ADMIN — LORAZEPAM 1 MG: 2 INJECTION INTRAMUSCULAR; INTRAVENOUS at 08:02

## 2022-02-24 RX ADMIN — POTASSIUM CHLORIDE 10 MEQ: 10 INJECTION, SOLUTION INTRAVENOUS at 09:02

## 2022-02-24 RX ADMIN — POTASSIUM CHLORIDE 10 MEQ: 7.46 INJECTION, SOLUTION INTRAVENOUS at 11:02

## 2022-02-24 RX ADMIN — POTASSIUM CHLORIDE 10 MEQ: 7.46 INJECTION, SOLUTION INTRAVENOUS at 02:02

## 2022-02-24 NOTE — ASSESSMENT & PLAN NOTE
-QTc >550, hold all QT prolonging meds including home seroquel. Pt. Has had QT prolongation in past, continue discontinuing high dose sertraline at discharge. Home med sertraline not associated with QT prolongation, will continue

## 2022-02-24 NOTE — PROGRESS NOTES
Cardiology Update:    Was consulted by team for ECG changes and elevated troponin levels (0.2-0.3). ECG with normal sinus rhythm with nonspecific ST - T changes (appear unchanged from prior ECGs). Repeat troponin levels flat.    Went to assess patient and he was not in the room. Discussed with his mother who denied chest discomfort and instead reported abdominal discomfort associated with nausea and vomiting.    Plan:  - r ecommend trending troponin levels to peak (can consider repeat ECG if pt develops chest discomfort)  -recommend TTE

## 2022-02-24 NOTE — HPI
22 yo M with PMHx of cyclical vomiting syndrome and anxiety who presents to the ED complaining of intracable nausea and vomiting for the last 5-6 days. Pt. Reports scar wells has been unable to tolerate almost any PO intake due to nasuea, vomiting and associated chest pain. He states that the symptoms have progressively worsening, especially his chest pain and SOB over the last day. Pt. Reports he is worried he is dehydrated and he feels like he is having intermittent palpitations. He reports having prior similar symptoms in the past, but he thinks the chest pain is more severe this time. No reported hematemesis, no fevers, chills, cough, or diarrhea. Continued vomiting today. Qtc starting to come down, K nearly replete. Pt reported blood instool this evening ~7pm, was straining during day so potentially hemorrhoid or splitting from constipation, will recheck Hb, make NPO at mn, and consult GI for tomorrow morning.

## 2022-02-24 NOTE — PLAN OF CARE
SW attempted to complete assessment on patient but was having procedure done in room. SW will continue to follow.    Leny Vanegas LMSW  PRN - [facility]  [phone]

## 2022-02-24 NOTE — ASSESSMENT & PLAN NOTE
-Trop 0.262 in setting of dehydration, electrolyyte abnormalities. EKG with ST elevations in inferolateral leads. Case discussed with cardiology who believe to be 2/2 early repolarization. Recommend electrolyte repletion, continue to trend. TTE in am  -Cards consulted, f/u official recommendations

## 2022-02-24 NOTE — H&P
[facility] - Emergency Dept  [facility] Medicine  History & Physical    Patient Name: Joel Coleman  MRN: [id number]  Patient Class: IP- Inpatient  Admission Date: 2/23/2022  Attending Physician: Nicholas Miller MD   Primary Care Provider: Primary Doctor No         Patient information was obtained from patient, past medical records and ER records.     Subjective:     Principal Problem:Intractable cyclical vomiting with nausea    Chief Complaint:   Chief Complaint   Patient presents with    Panic Attack        HPI: 20 yo M with PMHx of cyclical vomiting syndrome and anxiety who presents to the ED complaining of intracable nausea and vomiting for the last 5-6 days. Pt. Reports scar wells has been unable to tolerate almost any PO intake due to nasuea, vomiting and associated chest pain. He states that the symptoms have progressively worsening, especially his chest pain and SOB over the last day. Pt. Reports he is worried he is dehydrated and he feels like he is having intermittent palpitations. He reports having prior similar symptoms in the past, but he thinks the chest pain is more severe this time. No reported hematemesis, no fevers, chills, cough, or diarrhea.      Past Medical History:   Diagnosis Date    Abdominal migraine     Acquired pneumomediastinum     secondary to vomiting    Cyclical vomiting        History reviewed. No pertinent surgical history.    Review of patient's allergies indicates:  No Known Allergies    No current facility-administered medications on file prior to encounter.     Current Outpatient Medications on File Prior to Encounter   Medication Sig    ondansetron (ZOFRAN) 4 MG tablet Take 1 tablet (4 mg total) by mouth every 8 (eight) hours as needed for Nausea.    pantoprazole (PROTONIX) 40 MG tablet Take 1 tablet (40 mg total) by mouth once daily.    promethazine (PHENERGAN) 25 MG suppository Place 1 suppository (25 mg total) rectally every 6 (six) hours as needed for Nausea.     SUMAtriptan (IMITREX) 20 mg/actuation nasal spray Spray 1 spray (20 mg total) by Nasal route daily as needed for Migraine. 1 spray to a single nostril at first sign of migraine (nausea)    [DISCONTINUED] potassium bicarbonate (K-LYTE) disintegrating tablet Take 1 tablet (25 mEq total) by mouth once daily.     Family History       Problem Relation (Age of Onset)    Bipolar disorder Father    Migraines Father          Tobacco Use    Smoking status: Current Some Day Smoker    Smokeless tobacco: Never Used   Substance and Sexual Activity    Alcohol use: Yes    Drug use: Yes     Types: Marijuana    Sexual activity: Not on file     Review of Systems   Constitutional:  Positive for diaphoresis. Negative for activity change, chills, fever and unexpected weight change.   HENT:  Negative for congestion and sore throat.    Respiratory:  Negative for cough, shortness of breath and wheezing.    Cardiovascular:  Positive for chest pain. Negative for palpitations and leg swelling.   Gastrointestinal:  Positive for abdominal pain, nausea and vomiting. Negative for blood in stool.   Genitourinary:  Negative for dysuria and hematuria.   Musculoskeletal:  Negative for arthralgias and neck pain.   Skin:  Negative for color change and rash.   Neurological:  Negative for dizziness, seizures and numbness.   Psychiatric/Behavioral:  Positive for agitation. Negative for hallucinations and suicidal ideas.    Objective:     Vital Signs (Most Recent):  Temp: 98.5 °F (36.9 °C) (02/23/22 2014)  Pulse: 94 (02/23/22 2014)  Resp: 20 (02/23/22 2014)  BP: (!) 150/83 (02/23/22 2014)  SpO2: 97 % (02/23/22 2014)   Vital Signs (24h Range):  Temp:  [98.5 °F (36.9 °C)] 98.5 °F (36.9 °C)  Pulse:  [] 94  Resp:  [13-25] 20  SpO2:  [91 %-100 %] 97 %  BP: (130-150)/(71-95) 150/83     Weight: 83.9 kg (185 lb)  Body mass index is 25.09 kg/m².    Physical Exam        Significant Labs: All pertinent labs within the past 24 hours have been  reviewed.  CBC:   Recent Labs   Lab 02/23/22  1240   WBC 18.55*   HGB 17.6   HCT 49.7        CMP:   Recent Labs   Lab 02/23/22  1440   *   K 2.2*   CL 71*   CO2 39*   *   BUN 30*   CREATININE 1.3   CALCIUM 9.6   PROT 8.1   ALBUMIN 4.7   BILITOT 1.8*   ALKPHOS 112   AST 40   ALT 37   ANIONGAP 16   EGFRNONAA >60.0     Cardiac Markers:   Recent Labs   Lab 02/23/22  1240   BNP <10       Significant Imaging: I have reviewed all pertinent imaging results/findings within the past 24 hours.    Assessment/Plan:     * Intractable cyclical vomiting with nausea  -Pt. With intractable N/V, profound electrolyte disturbances with severe hypokalemia, alkalaosis and hypochloremia  -IV fluids, electrollyte replacement with IV KCl hourly and NS  -Pt. With prolonged QT, will avoid zofran, phenergan and other prolonging medications. Pt. Had good response with IV ativan PRn which worked during previous admission      Elevated troponin    -Trop 0.262 in setting of dehydration, electrolyyte abnormalities. EKG with ST elevations in inferolateral leads. Case discussed with cardiology who believe to be 2/2 early repolarization. Recommend electrolyte repletion, continue to trend. TTE in am  -Cards consulted, f/u official recommendations    Hypokalemia  -2/2 intractable N/V, treat as above. IV fluids. BMP Q8 until improving      Prolonged Q-T interval on ECG  -QTc >550, hold all QT prolonging meds including home seroquel. Pt. Has had QT prolongation in past, continue discontinuing high dose sertraline at discharge. Home med sertraline not associated with QT prolongation, will continue  -Monitor on telmetry      Metabolic alkalosis  -2/2 intractable N/V, treat as above. IV fluids. BMP Q8 until improving      Anxiety    -QTc >550, hold all QT prolonging meds including home seroquel. Pt. Has had QT prolongation in past, continue discontinuing high dose sertraline at discharge. Home med sertraline not associated with QT  prolongation, will continue      VTE Risk Mitigation (From admission, onward)         Ordered     enoxaparin injection 40 mg  Daily         02/23/22 2057     Place sequential compression device  Until discontinued         02/23/22 2057     IP VTE HIGH RISK PATIENT  Once         02/23/22 2057                   Dawood Escobar MD  Department of Hospital Medicine   [facility] - Emergency Dept

## 2022-02-24 NOTE — SUBJECTIVE & OBJECTIVE
Past Medical History:   Diagnosis Date    Abdominal migraine     Acquired pneumomediastinum     secondary to vomiting    Cyclical vomiting        History reviewed. No pertinent surgical history.    Review of patient's allergies indicates:  No Known Allergies    No current facility-administered medications on file prior to encounter.     Current Outpatient Medications on File Prior to Encounter   Medication Sig    ondansetron (ZOFRAN) 4 MG tablet Take 1 tablet (4 mg total) by mouth every 8 (eight) hours as needed for Nausea.    pantoprazole (PROTONIX) 40 MG tablet Take 1 tablet (40 mg total) by mouth once daily.    promethazine (PHENERGAN) 25 MG suppository Place 1 suppository (25 mg total) rectally every 6 (six) hours as needed for Nausea.    SUMAtriptan (IMITREX) 20 mg/actuation nasal spray Spray 1 spray (20 mg total) by Nasal route daily as needed for Migraine. 1 spray to a single nostril at first sign of migraine (nausea)    [DISCONTINUED] potassium bicarbonate (K-LYTE) disintegrating tablet Take 1 tablet (25 mEq total) by mouth once daily.     Family History       Problem Relation (Age of Onset)    Bipolar disorder Father    Migraines Father          Tobacco Use    Smoking status: Current Some Day Smoker    Smokeless tobacco: Never Used   Substance and Sexual Activity    Alcohol use: Yes    Drug use: Yes     Types: Marijuana    Sexual activity: Not on file     Review of Systems   Constitutional:  Positive for diaphoresis. Negative for activity change, chills, fever and unexpected weight change.   HENT:  Negative for congestion and sore throat.    Respiratory:  Negative for cough, shortness of breath and wheezing.    Cardiovascular:  Positive for chest pain. Negative for palpitations and leg swelling.   Gastrointestinal:  Positive for abdominal pain, nausea and vomiting. Negative for blood in stool.   Genitourinary:  Negative for dysuria and hematuria.   Musculoskeletal:  Negative for arthralgias and neck pain.    Skin:  Negative for color change and rash.   Neurological:  Negative for dizziness, seizures and numbness.   Psychiatric/Behavioral:  Positive for agitation. Negative for hallucinations and suicidal ideas.    Objective:     Vital Signs (Most Recent):  Temp: 98.5 °F (36.9 °C) (02/23/22 2014)  Pulse: 94 (02/23/22 2014)  Resp: 20 (02/23/22 2014)  BP: (!) 150/83 (02/23/22 2014)  SpO2: 97 % (02/23/22 2014)   Vital Signs (24h Range):  Temp:  [98.5 °F (36.9 °C)] 98.5 °F (36.9 °C)  Pulse:  [] 94  Resp:  [13-25] 20  SpO2:  [91 %-100 %] 97 %  BP: (130-150)/(71-95) 150/83     Weight: 83.9 kg (185 lb)  Body mass index is 25.09 kg/m².    Physical Exam        Significant Labs: All pertinent labs within the past 24 hours have been reviewed.  CBC:   Recent Labs   Lab 02/23/22  1240   WBC 18.55*   HGB 17.6   HCT 49.7        CMP:   Recent Labs   Lab 02/23/22  1440   *   K 2.2*   CL 71*   CO2 39*   *   BUN 30*   CREATININE 1.3   CALCIUM 9.6   PROT 8.1   ALBUMIN 4.7   BILITOT 1.8*   ALKPHOS 112   AST 40   ALT 37   ANIONGAP 16   EGFRNONAA >60.0     Cardiac Markers:   Recent Labs   Lab 02/23/22  1240   BNP <10       Significant Imaging: I have reviewed all pertinent imaging results/findings within the past 24 hours.

## 2022-02-24 NOTE — ASSESSMENT & PLAN NOTE
-Pt. With intractable N/V, profound electrolyte disturbances with severe hypokalemia, alkalaosis and hypochloremia  -IV fluids, electrollyte replacement with IV KCl hourly and NS  -Pt. With prolonged QT, will avoid zofran, phenergan and other prolonging medications. Pt. Had good response with IV ativan PRn which worked during previous admission

## 2022-02-24 NOTE — ED TRIAGE NOTES
"Pt ambulated into ED, accompanied by aunt.  Reports anxiety, SOB, abdominal pain, and vomiting x6 days.  Reports hx of cyclical vomiting, states that he has not smoked marijuana for past 2 weeks and has been sober from "other drugs" for the past several months.  States he has been unable to take seroquel and zoloft for the past week due to vomiting; denies SI.      APPEARANCE: Patient appears anxious.    NEURO: Awake, alert and aware   Pupils equal and round.   HEENT: Head symmetrical. Bilateral eyes without redness or drainage. Bilateral ears without drainage. Bilateral nares patent without drainage.  CARDIAC:   Tachycardia present.  No murmur, rub or gallop auscultated.  RESPIRATORY:  SOB and tachypnea noted. Lungs clear throughout auscultation.  No accessory muscle use or retractions noted.  GI/: Abdominal pain reported.  Abdomen soft and non-distended. Adequate bowel sounds auscultated with no tenderness noted on palpation.    NEUROVASCULAR: All extremities are warm and pink with palpable pulses and capillary refill less than 3 seconds.  MUSCULOSKELETAL: Moves all extremities well; no obvious deformities noted.  SKIN:  Intact, no bruises or swelling.   SOCIAL: Patient is accompanied by aunt.       "

## 2022-02-24 NOTE — ED NOTES
Pt in bed, vitals WNL. Family at bedside. Pt here for cyclic vomiting syndrome.     Patient identifiers for Joel Coleman 21 y.o. male checked and correct.  Chief Complaint   Patient presents with    Panic Attack     Past Medical History:   Diagnosis Date    Abdominal migraine     Acquired pneumomediastinum     secondary to vomiting    Cyclical vomiting      Allergies reported: Review of patient's allergies indicates:  No Known Allergies      LOC: Patient is awake, alert, and aware of environment with an appropriate affect. Patient is oriented x 4 and speaking appropriately.  APPEARANCE: Patient resting comfortably and in no acute distress. Patient is clean and well groomed, patient's clothing is properly fastened.  HEENT: mm pink, moist, intact.   SKIN: The skin is warm and dry. Patient has normal skin turgor and moist mucus membranes.   MUSKULOSKELETAL: Patient is moving all extremities well, no obvious deformities noted. Pulses intact.   RESPIRATORY: Airway is open and patent. Respirations are spontaneous and non-labored with normal effort and rate.  CARDIAC: Patient has a normal rate and rhythm. No peripheral edema noted.   ABDOMEN: No distention noted. Soft and non-tender upon palpation.  NEUROLOGICAL: pupils 3mm, PERRL. Facial expression is symmetrical. Hand grasps are equal bilaterally. Normal sensation in all extremities when touched with finger.

## 2022-02-24 NOTE — ED NOTES
Pt nauseated and anxious. Pt had prn nausea medication but both are contraindicated in QT elongation which patient has. Message MD, awaiting new orders.

## 2022-02-24 NOTE — ASSESSMENT & PLAN NOTE
-QTc >550, hold all QT prolonging meds including home seroquel. Pt. Has had QT prolongation in past, continue discontinuing high dose sertraline at discharge. Home med sertraline not associated with QT prolongation, will continue  -Monitor on telmetry  -Still with significant vomiting, but with Qtc ~600 so avoiding anti-nausea meds. Patient has had extensive workup's over the years for cyclic vomiting, encountered at a very young age prior to age of smoking marijuana. Will concentrate on symptomatic tx and electrolyte and fluid replacement

## 2022-02-24 NOTE — NURSING
Pt arrived from ED via Wheelchair, upon arrival vital signs take, oriented to the room. Answered questions at the bedside. Instructed to call staff for any help in mobility. Educated on maintaining the peripheral IV and fall risk. Pt appeared to be very anxious and had one episode of emesis of 400CC. IV fluids infusing with Potassium chloride. No acute changes, WCTM.

## 2022-02-25 PROBLEM — K92.1 BLOOD IN STOOL: Status: ACTIVE | Noted: 2022-02-25

## 2022-02-25 LAB
ALBUMIN SERPL BCP-MCNC: 3.5 G/DL (ref 3.5–5.2)
ALP SERPL-CCNC: 77 U/L (ref 55–135)
ALT SERPL W/O P-5'-P-CCNC: 30 U/L (ref 10–44)
ANION GAP SERPL CALC-SCNC: 8 MMOL/L (ref 8–16)
ANION GAP SERPL CALC-SCNC: 9 MMOL/L (ref 8–16)
AST SERPL-CCNC: 22 U/L (ref 10–40)
BASOPHILS # BLD AUTO: 0.05 K/UL (ref 0–0.2)
BASOPHILS NFR BLD: 0.4 % (ref 0–1.9)
BILIRUB SERPL-MCNC: 0.9 MG/DL (ref 0.1–1)
BUN SERPL-MCNC: 10 MG/DL (ref 6–20)
BUN SERPL-MCNC: 12 MG/DL (ref 6–20)
CALCIUM SERPL-MCNC: 8.3 MG/DL (ref 8.7–10.5)
CALCIUM SERPL-MCNC: 8.8 MG/DL (ref 8.7–10.5)
CHLORIDE SERPL-SCNC: 93 MMOL/L (ref 95–110)
CHLORIDE SERPL-SCNC: 95 MMOL/L (ref 95–110)
CO2 SERPL-SCNC: 30 MMOL/L (ref 23–29)
CO2 SERPL-SCNC: 33 MMOL/L (ref 23–29)
CREAT SERPL-MCNC: 0.8 MG/DL (ref 0.5–1.4)
CREAT SERPL-MCNC: 0.8 MG/DL (ref 0.5–1.4)
DIFFERENTIAL METHOD: ABNORMAL
EOSINOPHIL # BLD AUTO: 0.1 K/UL (ref 0–0.5)
EOSINOPHIL NFR BLD: 0.5 % (ref 0–8)
ERYTHROCYTE [DISTWIDTH] IN BLOOD BY AUTOMATED COUNT: 12.5 % (ref 11.5–14.5)
EST. GFR  (AFRICAN AMERICAN): >60 ML/MIN/1.73 M^2
EST. GFR  (AFRICAN AMERICAN): >60 ML/MIN/1.73 M^2
EST. GFR  (NON AFRICAN AMERICAN): >60 ML/MIN/1.73 M^2
EST. GFR  (NON AFRICAN AMERICAN): >60 ML/MIN/1.73 M^2
GLUCOSE SERPL-MCNC: 101 MG/DL (ref 70–110)
GLUCOSE SERPL-MCNC: 92 MG/DL (ref 70–110)
HCT VFR BLD AUTO: 42.2 % (ref 40–54)
HGB BLD-MCNC: 13.7 G/DL (ref 14–18)
IMM GRANULOCYTES # BLD AUTO: 0.21 K/UL (ref 0–0.04)
IMM GRANULOCYTES NFR BLD AUTO: 1.7 % (ref 0–0.5)
INR PPP: 1.1 (ref 0.8–1.2)
LYMPHOCYTES # BLD AUTO: 2.1 K/UL (ref 1–4.8)
LYMPHOCYTES NFR BLD: 17.1 % (ref 18–48)
MCH RBC QN AUTO: 29.3 PG (ref 27–31)
MCHC RBC AUTO-ENTMCNC: 32.5 G/DL (ref 32–36)
MCV RBC AUTO: 90 FL (ref 82–98)
MONOCYTES # BLD AUTO: 1 K/UL (ref 0.3–1)
MONOCYTES NFR BLD: 7.9 % (ref 4–15)
NEUTROPHILS # BLD AUTO: 9 K/UL (ref 1.8–7.7)
NEUTROPHILS NFR BLD: 72.4 % (ref 38–73)
NRBC BLD-RTO: 0 /100 WBC
PLATELET # BLD AUTO: 362 K/UL (ref 150–450)
PMV BLD AUTO: 10.8 FL (ref 9.2–12.9)
POTASSIUM SERPL-SCNC: 3.2 MMOL/L (ref 3.5–5.1)
POTASSIUM SERPL-SCNC: 3.3 MMOL/L (ref 3.5–5.1)
PROT SERPL-MCNC: 5.5 G/DL (ref 6–8.4)
PROTHROMBIN TIME: 11 SEC (ref 9–12.5)
RBC # BLD AUTO: 4.67 M/UL (ref 4.6–6.2)
SODIUM SERPL-SCNC: 134 MMOL/L (ref 136–145)
SODIUM SERPL-SCNC: 134 MMOL/L (ref 136–145)
WBC # BLD AUTO: 12.46 K/UL (ref 3.9–12.7)

## 2022-02-25 PROCEDURE — 25000003 PHARM REV CODE 250: Performed by: STUDENT IN AN ORGANIZED HEALTH CARE EDUCATION/TRAINING PROGRAM

## 2022-02-25 PROCEDURE — 85610 PROTHROMBIN TIME: CPT | Performed by: STUDENT IN AN ORGANIZED HEALTH CARE EDUCATION/TRAINING PROGRAM

## 2022-02-25 PROCEDURE — 20600001 HC STEP DOWN PRIVATE ROOM

## 2022-02-25 PROCEDURE — 80053 COMPREHEN METABOLIC PANEL: CPT | Performed by: STUDENT IN AN ORGANIZED HEALTH CARE EDUCATION/TRAINING PROGRAM

## 2022-02-25 PROCEDURE — 63600175 PHARM REV CODE 636 W HCPCS: Performed by: STUDENT IN AN ORGANIZED HEALTH CARE EDUCATION/TRAINING PROGRAM

## 2022-02-25 PROCEDURE — 80048 BASIC METABOLIC PNL TOTAL CA: CPT | Performed by: STUDENT IN AN ORGANIZED HEALTH CARE EDUCATION/TRAINING PROGRAM

## 2022-02-25 PROCEDURE — 93010 ELECTROCARDIOGRAM REPORT: CPT | Mod: ,,, | Performed by: INTERNAL MEDICINE

## 2022-02-25 PROCEDURE — 63600175 PHARM REV CODE 636 W HCPCS: Performed by: HOSPITALIST

## 2022-02-25 PROCEDURE — 93005 ELECTROCARDIOGRAM TRACING: CPT

## 2022-02-25 PROCEDURE — 99233 PR SUBSEQUENT HOSPITAL CARE,LEVL III: ICD-10-PCS | Mod: ,,, | Performed by: STUDENT IN AN ORGANIZED HEALTH CARE EDUCATION/TRAINING PROGRAM

## 2022-02-25 PROCEDURE — 93010 EKG 12-LEAD: ICD-10-PCS | Mod: ,,, | Performed by: INTERNAL MEDICINE

## 2022-02-25 PROCEDURE — 25000003 PHARM REV CODE 250: Performed by: PHYSICIAN ASSISTANT

## 2022-02-25 PROCEDURE — 36415 COLL VENOUS BLD VENIPUNCTURE: CPT | Performed by: STUDENT IN AN ORGANIZED HEALTH CARE EDUCATION/TRAINING PROGRAM

## 2022-02-25 PROCEDURE — 99233 SBSQ HOSP IP/OBS HIGH 50: CPT | Mod: ,,, | Performed by: STUDENT IN AN ORGANIZED HEALTH CARE EDUCATION/TRAINING PROGRAM

## 2022-02-25 PROCEDURE — 85025 COMPLETE CBC W/AUTO DIFF WBC: CPT | Performed by: STUDENT IN AN ORGANIZED HEALTH CARE EDUCATION/TRAINING PROGRAM

## 2022-02-25 RX ORDER — POTASSIUM CHLORIDE 7.45 MG/ML
10 INJECTION INTRAVENOUS
Status: DISPENSED | OUTPATIENT
Start: 2022-02-25 | End: 2022-02-25

## 2022-02-25 RX ADMIN — POTASSIUM CHLORIDE 10 MEQ: 10 INJECTION, SOLUTION INTRAVENOUS at 02:02

## 2022-02-25 RX ADMIN — LORAZEPAM 1 MG: 2 INJECTION INTRAMUSCULAR; INTRAVENOUS at 09:02

## 2022-02-25 RX ADMIN — LORAZEPAM 1 MG: 2 INJECTION INTRAMUSCULAR; INTRAVENOUS at 05:02

## 2022-02-25 RX ADMIN — POTASSIUM CHLORIDE 10 MEQ: 7.46 INJECTION, SOLUTION INTRAVENOUS at 01:02

## 2022-02-25 RX ADMIN — POTASSIUM CHLORIDE 10 MEQ: 7.46 INJECTION, SOLUTION INTRAVENOUS at 12:02

## 2022-02-25 RX ADMIN — POTASSIUM CHLORIDE 10 MEQ: 10 INJECTION, SOLUTION INTRAVENOUS at 08:02

## 2022-02-25 RX ADMIN — LORAZEPAM 1 MG: 2 INJECTION INTRAMUSCULAR; INTRAVENOUS at 10:02

## 2022-02-25 RX ADMIN — ALUMINUM HYDROXIDE, MAGNESIUM HYDROXIDE, AND SIMETHICONE 30 ML: 200; 200; 20 SUSPENSION ORAL at 09:02

## 2022-02-25 RX ADMIN — POTASSIUM CHLORIDE 10 MEQ: 10 INJECTION, SOLUTION INTRAVENOUS at 04:02

## 2022-02-25 RX ADMIN — POTASSIUM CHLORIDE 10 MEQ: 10 INJECTION, SOLUTION INTRAVENOUS at 01:02

## 2022-02-25 RX ADMIN — ENOXAPARIN SODIUM 40 MG: 40 INJECTION SUBCUTANEOUS at 05:02

## 2022-02-25 RX ADMIN — POTASSIUM CHLORIDE 10 MEQ: 10 INJECTION, SOLUTION INTRAVENOUS at 03:02

## 2022-02-25 RX ADMIN — SODIUM CHLORIDE: 0.9 INJECTION, SOLUTION INTRAVENOUS at 04:02

## 2022-02-25 NOTE — PLAN OF CARE
Problem: Adult Inpatient Plan of Care  Goal: Plan of Care Review  Outcome: Ongoing, Progressing  Goal: Patient-Specific Goal (Individualized)  Outcome: Ongoing, Progressing  Goal: Absence of Hospital-Acquired Illness or Injury  Outcome: Ongoing, Progressing  Goal: Optimal Comfort and Wellbeing  Outcome: Ongoing, Progressing  Goal: Readiness for Transition of Care  Outcome: Ongoing, Progressing    END OF SHIFT NOTE:   PATIENT AWAKE MOST OF THE NIGHT. PT C/O HAVING NAUSEA AT THE BEGINNING OF THE SHIFT BUT HAS RESOLVED. PRN ATIVAN GIVEN PER ORDERS. PT ALSO C/O HAVING SOME HEART BURN. PRN MAALOX GIVEN PER ORDERS. NO DISTRESS NOTED AT THIS TIME. VITALS STABLE. PATIENT TURN SELF & AMBULATES IN ROOM BY SELF. BED IN LOW POSITION. CALL LIGHT WITH IN REACH.

## 2022-02-25 NOTE — HOSPITAL COURSE
Still with significant vomiting, but with Qtc ~600 so avoiding anti-nausea meds. Patient has had extensive workup's over the years for cyclic vomiting, encountered at a very young age prior to age of smoking marijuana. Will concentrate on symptomatic tx and electrolyte and fluid replacement. Had mild troponinemia but EHCO with no wall motion abnormalitites and valves looked normal with good EF, likely demand/stress/electrolyte derangement. Mild leukocytosis fluctuating, likely also from vomiting or mild hematochezia rather than infection, afebrile throughout. GI consulted given hematochezia and a/c cyclic vomiting. Hb 13's, baseline is 17's, may have some dilutional effect with fluids however.

## 2022-02-25 NOTE — PLAN OF CARE
Problem: Adult Inpatient Plan of Care  Goal: Plan of Care Review  Outcome: Ongoing, Progressing  Flowsheets (Taken 2/24/2022 1912)  Plan of Care Reviewed With: patient  Goal: Patient-Specific Goal (Individualized)  Outcome: Ongoing, Progressing  Goal: Readiness for Transition of Care  Outcome: Ongoing, Progressing

## 2022-02-25 NOTE — SUBJECTIVE & OBJECTIVE
Interval History: NAEON. Continued vomiting. Denies CP.     Review of Systems   Constitutional:  Positive for diaphoresis. Negative for activity change, chills, fever and unexpected weight change.   HENT:  Negative for congestion and sore throat.    Respiratory:  Negative for cough, shortness of breath and wheezing.    Cardiovascular:  Positive for chest pain. Negative for palpitations and leg swelling.   Gastrointestinal:  Positive for abdominal pain, nausea and vomiting. Negative for blood in stool.   Genitourinary:  Negative for dysuria and hematuria.   Musculoskeletal:  Negative for arthralgias and neck pain.   Skin:  Negative for color change and rash.   Neurological:  Negative for dizziness, seizures and numbness.   Psychiatric/Behavioral:  Positive for agitation. Negative for hallucinations and suicidal ideas.    Objective:     Vital Signs (Most Recent):  Temp: 98 °F (36.7 °C) (02/24/22 1604)  Pulse: 87 (02/24/22 1604)  Resp: 18 (02/24/22 1604)  BP: (!) 146/75 (02/24/22 1604)  SpO2: 100 % (02/24/22 1604)   Vital Signs (24h Range):  Temp:  [97.8 °F (36.6 °C)-98.8 °F (37.1 °C)] 98 °F (36.7 °C)  Pulse:  [73-96] 87  Resp:  [15-20] 18  SpO2:  [94 %-100 %] 100 %  BP: (101-150)/(70-96) 146/75     Weight: 93.9 kg (207 lb)  Body mass index is 27.31 kg/m².    Physical Exam  Vitals reviewed.   Constitutional:       General: He is not in acute distress.     Appearance: He is well-developed. He is not diaphoretic.   HENT:      Head: Normocephalic and atraumatic.   Eyes:      General: No scleral icterus.     Pupils: Pupils are equal, round, and reactive to light.   Neck:      Thyroid: No thyromegaly.   Cardiovascular:      Rate and Rhythm: Normal rate and regular rhythm.      Heart sounds: No murmur heard.    No gallop.   Pulmonary:      Effort: Pulmonary effort is normal.      Breath sounds: Normal breath sounds. No stridor. No wheezing or rales.   Abdominal:      General: There is no distension.      Palpations: Abdomen  is soft.      Tenderness: There is no abdominal tenderness.   Musculoskeletal:         General: No swelling or deformity. Normal range of motion.      Cervical back: Normal range of motion. No rigidity.   Lymphadenopathy:      Cervical: No cervical adenopathy.   Skin:     General: Skin is warm.      Capillary Refill: Capillary refill takes less than 2 seconds.   Neurological:      General: No focal deficit present.      Mental Status: He is alert and oriented to person, place, and time.      Cranial Nerves: No cranial nerve deficit.      Motor: No weakness.   Psychiatric:         Mood and Affect: Mood normal.         Behavior: Behavior normal.         CRANIAL NERVES     CN III, IV, VI   Pupils are equal, round, and reactive to light.       Recent Results (from the past 24 hour(s))   POCT COVID-19 Rapid Screening    Collection Time: 02/23/22  7:40 PM   Result Value Ref Range    POC Rapid COVID Negative Negative     Acceptable Yes    Troponin I    Collection Time: 02/23/22  9:27 PM   Result Value Ref Range    Troponin I 0.329 (H) 0.000 - 0.026 ng/mL   Basic metabolic panel    Collection Time: 02/23/22  9:27 PM   Result Value Ref Range    Sodium 129 (L) 136 - 145 mmol/L    Potassium 2.2 (LL) 3.5 - 5.1 mmol/L    Chloride 75 (L) 95 - 110 mmol/L    CO2 37 (H) 23 - 29 mmol/L    Glucose 124 (H) 70 - 110 mg/dL    BUN 26 (H) 6 - 20 mg/dL    Creatinine 1.3 0.5 - 1.4 mg/dL    Calcium 9.0 8.7 - 10.5 mg/dL    Anion Gap 17 (H) 8 - 16 mmol/L    eGFR if African American >60.0 >60 mL/min/1.73 m^2    eGFR if non African American >60.0 >60 mL/min/1.73 m^2   Magnesium    Collection Time: 02/23/22  9:27 PM   Result Value Ref Range    Magnesium 3.3 (H) 1.6 - 2.6 mg/dL   CBC with Automated Differential    Collection Time: 02/24/22  3:54 AM   Result Value Ref Range    WBC 13.66 (H) 3.90 - 12.70 K/uL    RBC 4.94 4.60 - 6.20 M/uL    Hemoglobin 14.9 14.0 - 18.0 g/dL    Hematocrit 42.2 40.0 - 54.0 %    MCV 85 82 - 98 fL    MCH  30.2 27.0 - 31.0 pg    MCHC 35.3 32.0 - 36.0 g/dL    RDW 12.4 11.5 - 14.5 %    Platelets 314 150 - 450 K/uL    MPV 10.8 9.2 - 12.9 fL    Immature Granulocytes 1.0 (H) 0.0 - 0.5 %    Gran # (ANC) 9.8 (H) 1.8 - 7.7 K/uL    Immature Grans (Abs) 0.13 (H) 0.00 - 0.04 K/uL    Lymph # 2.2 1.0 - 4.8 K/uL    Mono # 1.5 (H) 0.3 - 1.0 K/uL    Eos # 0.0 0.0 - 0.5 K/uL    Baso # 0.05 0.00 - 0.20 K/uL    nRBC 0 0 /100 WBC    Gran % 71.5 38.0 - 73.0 %    Lymph % 15.9 (L) 18.0 - 48.0 %    Mono % 11.1 4.0 - 15.0 %    Eosinophil % 0.1 0.0 - 8.0 %    Basophil % 0.4 0.0 - 1.9 %    Differential Method Automated    Basic Metabolic Panel (BMP)    Collection Time: 02/24/22  3:54 AM   Result Value Ref Range    Sodium 130 (L) 136 - 145 mmol/L    Potassium 2.7 (LL) 3.5 - 5.1 mmol/L    Chloride 80 (L) 95 - 110 mmol/L    CO2 39 (H) 23 - 29 mmol/L    Glucose 124 (H) 70 - 110 mg/dL    BUN 24 (H) 6 - 20 mg/dL    Creatinine 1.2 0.5 - 1.4 mg/dL    Calcium 8.7 8.7 - 10.5 mg/dL    Anion Gap 11 8 - 16 mmol/L    eGFR if African American >60.0 >60 mL/min/1.73 m^2    eGFR if non African American >60.0 >60 mL/min/1.73 m^2   Magnesium    Collection Time: 02/24/22  3:54 AM   Result Value Ref Range    Magnesium 3.1 (H) 1.6 - 2.6 mg/dL   Troponin I    Collection Time: 02/24/22  3:54 AM   Result Value Ref Range    Troponin I 0.340 (H) 0.000 - 0.026 ng/mL   Echo Saline Bubble? No    Collection Time: 02/24/22  3:15 PM   Result Value Ref Range    Ascending aorta 3.17 cm    STJ 2.92 cm    AV mean gradient 2 mmHg    Ao peak rudolph 0.93 m/s    Ao VTI 16.13 cm    IVS 0.84 0.6 - 1.1 cm    LA size 3.29 cm    Left Atrium Major Axis 4.91 cm    Left Atrium Minor Axis 3.77 cm    LVIDd 5.27 3.5 - 6.0 cm    LVIDs 3.37 2.1 - 4.0 cm    LVOT diameter 2.24 cm    LVOT peak VTI 18.44 cm    Posterior Wall 0.99 0.6 - 1.1 cm    MV Peak A Rudolph 0.46 m/s    E wave deceleration time 120.93 msec    MV Peak E Rudolph 0.73 m/s    RA Major Axis 3.66 cm    RA Width 3.01 cm    RVDD 1.91 cm    Sinus  3.57 cm    TDI LATERAL 0.15 m/s    TDI SEPTAL 0.16 m/s    LA WIDTH 3.14 cm    MV stenosis pressure 1/2 time 35.07 ms    LV Diastolic Volume 133.41 mL    LV Systolic Volume 46.35 mL    RV S' 10.66 cm/s    LVOT peak adam 1.01 m/s    LA volume (mod) 35.09 cm3    LV LATERAL E/E' RATIO 4.87 m/s    LV SEPTAL E/E' RATIO 4.56 m/s    FS 36 %    LA volume 37.45 cm3    LV mass 176.61 g    Left Ventricle Relative Wall Thickness 0.38 cm    AV valve area 4.50 cm2    AV Velocity Ratio 1.09     AV index (prosthetic) 1.14     MV valve area p 1/2 method 6.27 cm2    E/A ratio 1.59     Mean e' 0.16 m/s    LVOT area 3.9 cm2    LVOT stroke volume 72.63 cm3    AV peak gradient 3 mmHg    E/E' ratio 4.71 m/s    LV Systolic Volume Index 21.3 mL/m2    LV Diastolic Volume Index 61.20 mL/m2    LA Volume Index 17.2 mL/m2    LV Mass Index 81 g/m2    LA Volume Index (Mod) 16.1 mL/m2    BSA 2.2 m2    Right Atrial Pressure (from IVC) 3 mmHg    EF 65 %   Basic metabolic panel    Collection Time: 02/24/22  4:09 PM   Result Value Ref Range    Sodium 132 (L) 136 - 145 mmol/L    Potassium 3.0 (L) 3.5 - 5.1 mmol/L    Chloride 82 (L) 95 - 110 mmol/L    CO2 34 (H) 23 - 29 mmol/L    Glucose 125 (H) 70 - 110 mg/dL    BUN 20 6 - 20 mg/dL    Creatinine 1.1 0.5 - 1.4 mg/dL    Calcium 9.3 8.7 - 10.5 mg/dL    Anion Gap 16 8 - 16 mmol/L    eGFR if African American >60.0 >60 mL/min/1.73 m^2    eGFR if non African American >60.0 >60 mL/min/1.73 m^2       Microbiology Results (last 7 days)       ** No results found for the last 168 hours. **             Imaging Results              US Abdomen Limited (Final result)  Result time 02/23/22 16:36:06      Final result by Matt Velazquez MD (02/23/22 16:36:06)                   Impression:      1.  No significant abnormalities are identified.    Electronically signed by resident: Oswald Peck  Date:    02/23/2022  Time:    16:24    Electronically signed by: Matt Velazquez MD  Date:    02/23/2022  Time:    16:36                Narrative:    EXAMINATION:  US ABDOMEN LIMITED    CLINICAL HISTORY:  RUQ tenderness and leukocytosis and vomiting.  Please do RUQ US;    TECHNIQUE:  Complete abdominal ultrasound was performed.    COMPARISON:  Abdominal radiograph 02/23/2022, ultrasound abdomen limited 06/17/2021    FINDINGS:  The visualized pancreas is unremarkable.    There is no intra or extrahepatic bile duct dilatation.  The common duct measures 2 mm.    The gallbladder is unremarkable with no evidence of wall thickening, pericholecystic fluid, sonographic Montano's sign, or cholelithiasis.    There is no free fluid within the visualized abdomen.                                       US Abdomen Limited (Final result)  Result time 02/23/22 16:35:41      Final result by Matt Velazquez MD (02/23/22 16:35:41)                   Impression:      Appendix is not visualized, with no secondary findings to support a diagnosis of appendicitis.    Electronically signed by resident: Oswald Peck  Date:    02/23/2022  Time:    16:27    Electronically signed by: Matt Velazquez MD  Date:    02/23/2022  Time:    16:35               Narrative:    EXAMINATION:  US ABDOMEN LIMITED    CLINICAL HISTORY:  RLQ tenderness, concern for appendicitis;    TECHNIQUE:  Limited ultrasound of the right lower quadrant of the abdomen was performed with graded compression in the expected location of the appendix.    COMPARISON:  Abdominal radiograph 02/23/2022, ultrasound abdomen limited 06/17/2021    FINDINGS:  Appendix:    Visualized: No    Diameter (with compression): N/A    Compressibility: N/A    Vascularity: N/A    Tali-Appendiceal Fat Infiltration: N/A    Tali-Appendiceal Fluid: N/A    Right Lower Quadrant Pain:    Tenderness with Compression: Mild    Rebound Tenderness: Mild    Miscellaneous: No ascites. No lymphadenopathy.                                       X-Ray Abdomen Flat And Erect (Final result)  Result time 02/23/22 14:18:30      Final result by Devin  MILAGROS Michel III, MD (02/23/22 14:18:30)                   Narrative:    EXAMINATION:  XR ABDOMEN FLAT AND ERECT    CLINICAL HISTORY:  Vomiting, unspecified    FINDINGS:  Abdomen flat erect: No obstruction, ileus, or perforation seen.      Electronically signed by: Devin Michel MD  Date:    02/23/2022  Time:    14:18                                     X-Ray Chest PA And Lateral (Final result)  Result time 02/23/22 14:18:47      Final result by Devin Michel III, MD (02/23/22 14:18:47)                   Impression:      No acute process seen.      Electronically signed by: Devin Michel MD  Date:    02/23/2022  Time:    14:18               Narrative:    EXAMINATION:  XR CHEST PA AND LATERAL    CLINICAL HISTORY:  Shortness of breath    FINDINGS:  Heart size is normal.  Lungs are clear and the bones show nothing unusual.

## 2022-02-25 NOTE — PROGRESS NOTES
[facility] - Telemetry [facility] Medicine  Progress Note    Patient Name: Joel Coleman  MRN: [id number]  Patient Class: IP- Inpatient   Admission Date: 2/23/2022  Length of Stay: 1 days  Attending Physician: Will Whyte MD  Primary Care Provider: Primary Doctor No        Subjective:     Principal Problem:Intractable cyclical vomiting with nausea        HPI:  22 yo M with PMHx of cyclical vomiting syndrome and anxiety who presents to the ED complaining of intracable nausea and vomiting for the last 5-6 days. Pt. Reports scar tshe has been unable to tolerate almost any PO intake due to nasuea, vomiting and associated chest pain. He states that the symptoms have progressively worsening, especially his chest pain and SOB over the last day. Pt. Reports he is worried he is dehydrated and he feels like he is having intermittent palpitations. He reports having prior similar symptoms in the past, but he thinks the chest pain is more severe this time. No reported hematemesis, no fevers, chills, cough, or diarrhea.      Overview/Hospital Course:  Still with significant vomiting, but with Qtc ~600 so avoiding anti-nausea meds. Patient has had extensive workup's over the years for cyclic vomiting, encountered at a very young age prior to age of smoking marijuana. Will concentrate on symptomatic tx and electrolyte and fluid replacement       Interval History: NAEON. Continued vomiting. Denies CP.     Review of Systems   Constitutional:  Positive for diaphoresis. Negative for activity change, chills, fever and unexpected weight change.   HENT:  Negative for congestion and sore throat.    Respiratory:  Negative for cough, shortness of breath and wheezing.    Cardiovascular:  Positive for chest pain. Negative for palpitations and leg swelling.   Gastrointestinal:  Positive for abdominal pain, nausea and vomiting. Negative for blood in stool.   Genitourinary:  Negative for dysuria and hematuria.   Musculoskeletal:  Negative  for arthralgias and neck pain.   Skin:  Negative for color change and rash.   Neurological:  Negative for dizziness, seizures and numbness.   Psychiatric/Behavioral:  Positive for agitation. Negative for hallucinations and suicidal ideas.    Objective:     Vital Signs (Most Recent):  Temp: 98 °F (36.7 °C) (02/24/22 1604)  Pulse: 87 (02/24/22 1604)  Resp: 18 (02/24/22 1604)  BP: (!) 146/75 (02/24/22 1604)  SpO2: 100 % (02/24/22 1604)   Vital Signs (24h Range):  Temp:  [97.8 °F (36.6 °C)-98.8 °F (37.1 °C)] 98 °F (36.7 °C)  Pulse:  [73-96] 87  Resp:  [15-20] 18  SpO2:  [94 %-100 %] 100 %  BP: (101-150)/(70-96) 146/75     Weight: 93.9 kg (207 lb)  Body mass index is 27.31 kg/m².    Physical Exam  Vitals reviewed.   Constitutional:       General: He is not in acute distress.     Appearance: He is well-developed. He is not diaphoretic.   HENT:      Head: Normocephalic and atraumatic.   Eyes:      General: No scleral icterus.     Pupils: Pupils are equal, round, and reactive to light.   Neck:      Thyroid: No thyromegaly.   Cardiovascular:      Rate and Rhythm: Normal rate and regular rhythm.      Heart sounds: No murmur heard.    No gallop.   Pulmonary:      Effort: Pulmonary effort is normal.      Breath sounds: Normal breath sounds. No stridor. No wheezing or rales.   Abdominal:      General: There is no distension.      Palpations: Abdomen is soft.      Tenderness: There is no abdominal tenderness.   Musculoskeletal:         General: No swelling or deformity. Normal range of motion.      Cervical back: Normal range of motion. No rigidity.   Lymphadenopathy:      Cervical: No cervical adenopathy.   Skin:     General: Skin is warm.      Capillary Refill: Capillary refill takes less than 2 seconds.   Neurological:      General: No focal deficit present.      Mental Status: He is alert and oriented to person, place, and time.      Cranial Nerves: No cranial nerve deficit.      Motor: No weakness.   Psychiatric:          Mood and Affect: Mood normal.         Behavior: Behavior normal.         CRANIAL NERVES     CN III, IV, VI   Pupils are equal, round, and reactive to light.       Recent Results (from the past 24 hour(s))   POCT COVID-19 Rapid Screening    Collection Time: 02/23/22  7:40 PM   Result Value Ref Range    POC Rapid COVID Negative Negative     Acceptable Yes    Troponin I    Collection Time: 02/23/22  9:27 PM   Result Value Ref Range    Troponin I 0.329 (H) 0.000 - 0.026 ng/mL   Basic metabolic panel    Collection Time: 02/23/22  9:27 PM   Result Value Ref Range    Sodium 129 (L) 136 - 145 mmol/L    Potassium 2.2 (LL) 3.5 - 5.1 mmol/L    Chloride 75 (L) 95 - 110 mmol/L    CO2 37 (H) 23 - 29 mmol/L    Glucose 124 (H) 70 - 110 mg/dL    BUN 26 (H) 6 - 20 mg/dL    Creatinine 1.3 0.5 - 1.4 mg/dL    Calcium 9.0 8.7 - 10.5 mg/dL    Anion Gap 17 (H) 8 - 16 mmol/L    eGFR if African American >60.0 >60 mL/min/1.73 m^2    eGFR if non African American >60.0 >60 mL/min/1.73 m^2   Magnesium    Collection Time: 02/23/22  9:27 PM   Result Value Ref Range    Magnesium 3.3 (H) 1.6 - 2.6 mg/dL   CBC with Automated Differential    Collection Time: 02/24/22  3:54 AM   Result Value Ref Range    WBC 13.66 (H) 3.90 - 12.70 K/uL    RBC 4.94 4.60 - 6.20 M/uL    Hemoglobin 14.9 14.0 - 18.0 g/dL    Hematocrit 42.2 40.0 - 54.0 %    MCV 85 82 - 98 fL    MCH 30.2 27.0 - 31.0 pg    MCHC 35.3 32.0 - 36.0 g/dL    RDW 12.4 11.5 - 14.5 %    Platelets 314 150 - 450 K/uL    MPV 10.8 9.2 - 12.9 fL    Immature Granulocytes 1.0 (H) 0.0 - 0.5 %    Gran # (ANC) 9.8 (H) 1.8 - 7.7 K/uL    Immature Grans (Abs) 0.13 (H) 0.00 - 0.04 K/uL    Lymph # 2.2 1.0 - 4.8 K/uL    Mono # 1.5 (H) 0.3 - 1.0 K/uL    Eos # 0.0 0.0 - 0.5 K/uL    Baso # 0.05 0.00 - 0.20 K/uL    nRBC 0 0 /100 WBC    Gran % 71.5 38.0 - 73.0 %    Lymph % 15.9 (L) 18.0 - 48.0 %    Mono % 11.1 4.0 - 15.0 %    Eosinophil % 0.1 0.0 - 8.0 %    Basophil % 0.4 0.0 - 1.9 %    Differential Method  Automated    Basic Metabolic Panel (BMP)    Collection Time: 02/24/22  3:54 AM   Result Value Ref Range    Sodium 130 (L) 136 - 145 mmol/L    Potassium 2.7 (LL) 3.5 - 5.1 mmol/L    Chloride 80 (L) 95 - 110 mmol/L    CO2 39 (H) 23 - 29 mmol/L    Glucose 124 (H) 70 - 110 mg/dL    BUN 24 (H) 6 - 20 mg/dL    Creatinine 1.2 0.5 - 1.4 mg/dL    Calcium 8.7 8.7 - 10.5 mg/dL    Anion Gap 11 8 - 16 mmol/L    eGFR if African American >60.0 >60 mL/min/1.73 m^2    eGFR if non African American >60.0 >60 mL/min/1.73 m^2   Magnesium    Collection Time: 02/24/22  3:54 AM   Result Value Ref Range    Magnesium 3.1 (H) 1.6 - 2.6 mg/dL   Troponin I    Collection Time: 02/24/22  3:54 AM   Result Value Ref Range    Troponin I 0.340 (H) 0.000 - 0.026 ng/mL   Echo Saline Bubble? No    Collection Time: 02/24/22  3:15 PM   Result Value Ref Range    Ascending aorta 3.17 cm    STJ 2.92 cm    AV mean gradient 2 mmHg    Ao peak rudolph 0.93 m/s    Ao VTI 16.13 cm    IVS 0.84 0.6 - 1.1 cm    LA size 3.29 cm    Left Atrium Major Axis 4.91 cm    Left Atrium Minor Axis 3.77 cm    LVIDd 5.27 3.5 - 6.0 cm    LVIDs 3.37 2.1 - 4.0 cm    LVOT diameter 2.24 cm    LVOT peak VTI 18.44 cm    Posterior Wall 0.99 0.6 - 1.1 cm    MV Peak A Rudolph 0.46 m/s    E wave deceleration time 120.93 msec    MV Peak E Rudolph 0.73 m/s    RA Major Axis 3.66 cm    RA Width 3.01 cm    RVDD 1.91 cm    Sinus 3.57 cm    TDI LATERAL 0.15 m/s    TDI SEPTAL 0.16 m/s    LA WIDTH 3.14 cm    MV stenosis pressure 1/2 time 35.07 ms    LV Diastolic Volume 133.41 mL    LV Systolic Volume 46.35 mL    RV S' 10.66 cm/s    LVOT peak rudolph 1.01 m/s    LA volume (mod) 35.09 cm3    LV LATERAL E/E' RATIO 4.87 m/s    LV SEPTAL E/E' RATIO 4.56 m/s    FS 36 %    LA volume 37.45 cm3    LV mass 176.61 g    Left Ventricle Relative Wall Thickness 0.38 cm    AV valve area 4.50 cm2    AV Velocity Ratio 1.09     AV index (prosthetic) 1.14     MV valve area p 1/2 method 6.27 cm2    E/A ratio 1.59     Mean e' 0.16 m/s     LVOT area 3.9 cm2    LVOT stroke volume 72.63 cm3    AV peak gradient 3 mmHg    E/E' ratio 4.71 m/s    LV Systolic Volume Index 21.3 mL/m2    LV Diastolic Volume Index 61.20 mL/m2    LA Volume Index 17.2 mL/m2    LV Mass Index 81 g/m2    LA Volume Index (Mod) 16.1 mL/m2    BSA 2.2 m2    Right Atrial Pressure (from IVC) 3 mmHg    EF 65 %   Basic metabolic panel    Collection Time: 02/24/22  4:09 PM   Result Value Ref Range    Sodium 132 (L) 136 - 145 mmol/L    Potassium 3.0 (L) 3.5 - 5.1 mmol/L    Chloride 82 (L) 95 - 110 mmol/L    CO2 34 (H) 23 - 29 mmol/L    Glucose 125 (H) 70 - 110 mg/dL    BUN 20 6 - 20 mg/dL    Creatinine 1.1 0.5 - 1.4 mg/dL    Calcium 9.3 8.7 - 10.5 mg/dL    Anion Gap 16 8 - 16 mmol/L    eGFR if African American >60.0 >60 mL/min/1.73 m^2    eGFR if non African American >60.0 >60 mL/min/1.73 m^2       Microbiology Results (last 7 days)       ** No results found for the last 168 hours. **             Imaging Results              US Abdomen Limited (Final result)  Result time 02/23/22 16:36:06      Final result by Matt Velazquez MD (02/23/22 16:36:06)                   Impression:      1.  No significant abnormalities are identified.    Electronically signed by resident: Oswald Peck  Date:    02/23/2022  Time:    16:24    Electronically signed by: Matt Velazquez MD  Date:    02/23/2022  Time:    16:36               Narrative:    EXAMINATION:  US ABDOMEN LIMITED    CLINICAL HISTORY:  RUQ tenderness and leukocytosis and vomiting.  Please do RUQ US;    TECHNIQUE:  Complete abdominal ultrasound was performed.    COMPARISON:  Abdominal radiograph 02/23/2022, ultrasound abdomen limited 06/17/2021    FINDINGS:  The visualized pancreas is unremarkable.    There is no intra or extrahepatic bile duct dilatation.  The common duct measures 2 mm.    The gallbladder is unremarkable with no evidence of wall thickening, pericholecystic fluid, sonographic Montano's sign, or cholelithiasis.    There is no free  fluid within the visualized abdomen.                                       US Abdomen Limited (Final result)  Result time 02/23/22 16:35:41      Final result by Matt Velazquez MD (02/23/22 16:35:41)                   Impression:      Appendix is not visualized, with no secondary findings to support a diagnosis of appendicitis.    Electronically signed by resident: Oswald Peck  Date:    02/23/2022  Time:    16:27    Electronically signed by: Matt Velazquez MD  Date:    02/23/2022  Time:    16:35               Narrative:    EXAMINATION:  US ABDOMEN LIMITED    CLINICAL HISTORY:  RLQ tenderness, concern for appendicitis;    TECHNIQUE:  Limited ultrasound of the right lower quadrant of the abdomen was performed with graded compression in the expected location of the appendix.    COMPARISON:  Abdominal radiograph 02/23/2022, ultrasound abdomen limited 06/17/2021    FINDINGS:  Appendix:    Visualized: No    Diameter (with compression): N/A    Compressibility: N/A    Vascularity: N/A    Tali-Appendiceal Fat Infiltration: N/A    Tali-Appendiceal Fluid: N/A    Right Lower Quadrant Pain:    Tenderness with Compression: Mild    Rebound Tenderness: Mild    Miscellaneous: No ascites. No lymphadenopathy.                                       X-Ray Abdomen Flat And Erect (Final result)  Result time 02/23/22 14:18:30      Final result by Devin Michel III, MD (02/23/22 14:18:30)                   Narrative:    EXAMINATION:  XR ABDOMEN FLAT AND ERECT    CLINICAL HISTORY:  Vomiting, unspecified    FINDINGS:  Abdomen flat erect: No obstruction, ileus, or perforation seen.      Electronically signed by: Devin Michel MD  Date:    02/23/2022  Time:    14:18                                     X-Ray Chest PA And Lateral (Final result)  Result time 02/23/22 14:18:47      Final result by Devin Michel III, MD (02/23/22 14:18:47)                   Impression:      No acute process seen.      Electronically signed by: Devin  MD Anand  Date:    02/23/2022  Time:    14:18               Narrative:    EXAMINATION:  XR CHEST PA AND LATERAL    CLINICAL HISTORY:  Shortness of breath    FINDINGS:  Heart size is normal.  Lungs are clear and the bones show nothing unusual.                                          Assessment/Plan:      * Intractable cyclical vomiting with nausea  -Pt. With intractable N/V, profound electrolyte disturbances with severe hypokalemia, alkalaosis and hypochloremia  -IV fluids, electrollyte replacement with IV KCl hourly and NS  -Pt. With prolonged QT, will avoid zofran, phenergan and other prolonging medications. Pt. Had good response with IV ativan PRn which worked during previous admission      Elevated troponin    -Trop 0.262 in setting of dehydration, electrolyyte abnormalities. EKG with ST elevations in inferolateral leads. Case discussed with cardiology who believe to be 2/2 early repolarization. Recommend electrolyte repletion, continue to trend. TTE in am  -Cards consulted, f/u official recommendations    Hypokalemia  -2/2 intractable N/V, treat as above. IV fluids. BMP Q8 until improving      Prolonged Q-T interval on ECG  -QTc >550, hold all QT prolonging meds including home seroquel. Pt. Has had QT prolongation in past, continue discontinuing high dose sertraline at discharge. Home med sertraline not associated with QT prolongation, will continue  -Monitor on telmetry  -Still with significant vomiting, but with Qtc ~600 so avoiding anti-nausea meds. Patient has had extensive workup's over the years for cyclic vomiting, encountered at a very young age prior to age of smoking marijuana. Will concentrate on symptomatic tx and electrolyte and fluid replacement       Metabolic alkalosis  -2/2 intractable N/V, treat as above. IV fluids. BMP Q8 until improving      Anxiety    -QTc >550, hold all QT prolonging meds including home seroquel. Pt. Has had QT prolongation in past, continue discontinuing high dose  sertraline at discharge. Home med sertraline not associated with QT prolongation, will continue        VTE Risk Mitigation (From admission, onward)         Ordered     enoxaparin injection 40 mg  Daily         02/23/22 2057     Place sequential compression device  Until discontinued         02/23/22 2057     IP VTE HIGH RISK PATIENT  Once         02/23/22 2057                Discharge Planning   TI: 2/28/2022     Code Status: Full Code   Is the patient medically ready for discharge?:     Reason for patient still in hospital (select all that apply): Patient trending condition and Treatment                     Will Whyte MD  Department of Hospital Medicine   [facility] - Telemetry Stepdown

## 2022-02-25 NOTE — PLAN OF CARE
[facility] - Telemetry Stepdown  Initial Discharge Assessment       Primary Care Provider: Primary Doctor No    Admission Diagnosis: Shortness of breath [R06.02]  Hypokalemia [E87.6]  Anxiety [F41.9]  Vomiting [R11.10]  Tachycardia [R00.0]  Sensation of chest tightness [R07.89]  Non-intractable vomiting with nausea, unspecified vomiting type [R11.2]    Admission Date: 2/23/2022  Expected Discharge Date: 2/26/2022    Discharge Barriers Identified: None    Payor: [employer] / Plan: [employer] OPEN ACCESS PLUS / Product Type: Commercial /     Extended Emergency Contact Information  Primary Emergency Contact: MICHAELA WALLACE  Mobile Phone: [phone]  Relation: Mother   needed? No  Secondary Emergency Contact: Manuel Coleman  Home Phone: [phone]  Relation: Brother    Discharge Plan A: Home  Discharge Plan B: Home Health    No Pharmacies Listed    Initial Assessment (most recent)       Adult Discharge Assessment - 02/25/22 1317          Discharge Assessment    Assessment Type Discharge Planning Assessment     Confirmed/corrected address, phone number and insurance Yes     Confirmed Demographics Correct on Facesheet     Source of Information patient     Communicated TI with patient/caregiver Date not available/Unable to determine     Reason For Admission Intractable cyclical vamiting with nausea     Lives With friend(s)     Facility Arrived From: Emergency room     Do you expect to return to your current living situation? Yes     Do you have help at home or someone to help you manage your care at home? Yes     Who are your caregiver(s) and their phone number(s)? 6 roommates     Prior to hospitilization cognitive status: Alert/Oriented     Current cognitive status: Alert/Oriented     Walking or Climbing Stairs Difficulty none     Dressing/Bathing Difficulty none     Equipment Currently Used at Home none     Readmission within 30 days? No     Patient currently being followed by outpatient case management? No     Do  you currently have service(s) that help you manage your care at home? No     Do you take prescription medications? Yes     Do you have any problems affording any of your prescribed medications? No     Is the patient taking medications as prescribed? yes     Who is going to help you get home at discharge? unsure who at this time     How do you get to doctors appointments? family or friend will provide     Are you on dialysis? No     Do you take coumadin? No     Discharge Plan A Home     Discharge Plan B Home Health     DME Needed Upon Discharge  none     Discharge Plan discussed with: Patient     Discharge Barriers Identified None                 CM met with patient at bedside for discharge planning.  Patient states he lives with 6 roommates in a one story house with no steps to enter.  Patient states he will have the help of his roommates at home if needed.  Patient is independent with ADL's and ambulating.  Patient's unsure who will transport him upon discharge.  Plan is to discharge to home.

## 2022-02-26 PROBLEM — E83.39 HYPOPHOSPHATEMIA: Status: ACTIVE | Noted: 2022-02-26

## 2022-02-26 PROBLEM — D72.829 LEUKOCYTOSIS: Status: ACTIVE | Noted: 2022-02-26

## 2022-02-26 LAB
ANION GAP SERPL CALC-SCNC: 9 MMOL/L (ref 8–16)
BASOPHILS # BLD AUTO: 0.05 K/UL (ref 0–0.2)
BASOPHILS NFR BLD: 0.3 % (ref 0–1.9)
BUN SERPL-MCNC: 7 MG/DL (ref 6–20)
CALCIUM SERPL-MCNC: 8.6 MG/DL (ref 8.7–10.5)
CHLORIDE SERPL-SCNC: 98 MMOL/L (ref 95–110)
CO2 SERPL-SCNC: 28 MMOL/L (ref 23–29)
CREAT SERPL-MCNC: 0.8 MG/DL (ref 0.5–1.4)
DIFFERENTIAL METHOD: ABNORMAL
EOSINOPHIL # BLD AUTO: 0.2 K/UL (ref 0–0.5)
EOSINOPHIL NFR BLD: 1.2 % (ref 0–8)
ERYTHROCYTE [DISTWIDTH] IN BLOOD BY AUTOMATED COUNT: 12.3 % (ref 11.5–14.5)
EST. GFR  (AFRICAN AMERICAN): >60 ML/MIN/1.73 M^2
EST. GFR  (NON AFRICAN AMERICAN): >60 ML/MIN/1.73 M^2
GLUCOSE SERPL-MCNC: 97 MG/DL (ref 70–110)
HCT VFR BLD AUTO: 42.6 % (ref 40–54)
HGB BLD-MCNC: 13.7 G/DL (ref 14–18)
IMM GRANULOCYTES # BLD AUTO: 0.21 K/UL (ref 0–0.04)
IMM GRANULOCYTES NFR BLD AUTO: 1.4 % (ref 0–0.5)
LACTATE SERPL-SCNC: 0.8 MMOL/L (ref 0.5–2.2)
LYMPHOCYTES # BLD AUTO: 3.4 K/UL (ref 1–4.8)
LYMPHOCYTES NFR BLD: 23.2 % (ref 18–48)
MAGNESIUM SERPL-MCNC: 2 MG/DL (ref 1.6–2.6)
MCH RBC QN AUTO: 29.3 PG (ref 27–31)
MCHC RBC AUTO-ENTMCNC: 32.2 G/DL (ref 32–36)
MCV RBC AUTO: 91 FL (ref 82–98)
MONOCYTES # BLD AUTO: 1.3 K/UL (ref 0.3–1)
MONOCYTES NFR BLD: 9 % (ref 4–15)
NEUTROPHILS # BLD AUTO: 9.6 K/UL (ref 1.8–7.7)
NEUTROPHILS NFR BLD: 64.9 % (ref 38–73)
NRBC BLD-RTO: 0 /100 WBC
PHOSPHATE SERPL-MCNC: 2.5 MG/DL (ref 2.7–4.5)
PLATELET # BLD AUTO: 325 K/UL (ref 150–450)
PMV BLD AUTO: 10.6 FL (ref 9.2–12.9)
POTASSIUM SERPL-SCNC: 3.3 MMOL/L (ref 3.5–5.1)
PROCALCITONIN SERPL IA-MCNC: 0.11 NG/ML
RBC # BLD AUTO: 4.67 M/UL (ref 4.6–6.2)
SODIUM SERPL-SCNC: 135 MMOL/L (ref 136–145)
WBC # BLD AUTO: 14.85 K/UL (ref 3.9–12.7)

## 2022-02-26 PROCEDURE — 84100 ASSAY OF PHOSPHORUS: CPT | Performed by: STUDENT IN AN ORGANIZED HEALTH CARE EDUCATION/TRAINING PROGRAM

## 2022-02-26 PROCEDURE — 99223 1ST HOSP IP/OBS HIGH 75: CPT | Mod: ,,, | Performed by: INTERNAL MEDICINE

## 2022-02-26 PROCEDURE — 80048 BASIC METABOLIC PNL TOTAL CA: CPT | Performed by: STUDENT IN AN ORGANIZED HEALTH CARE EDUCATION/TRAINING PROGRAM

## 2022-02-26 PROCEDURE — 99233 SBSQ HOSP IP/OBS HIGH 50: CPT | Mod: ,,, | Performed by: STUDENT IN AN ORGANIZED HEALTH CARE EDUCATION/TRAINING PROGRAM

## 2022-02-26 PROCEDURE — 85025 COMPLETE CBC W/AUTO DIFF WBC: CPT | Performed by: STUDENT IN AN ORGANIZED HEALTH CARE EDUCATION/TRAINING PROGRAM

## 2022-02-26 PROCEDURE — 83735 ASSAY OF MAGNESIUM: CPT | Performed by: STUDENT IN AN ORGANIZED HEALTH CARE EDUCATION/TRAINING PROGRAM

## 2022-02-26 PROCEDURE — 63600175 PHARM REV CODE 636 W HCPCS: Performed by: HOSPITALIST

## 2022-02-26 PROCEDURE — 84145 PROCALCITONIN (PCT): CPT | Performed by: STUDENT IN AN ORGANIZED HEALTH CARE EDUCATION/TRAINING PROGRAM

## 2022-02-26 PROCEDURE — 20600001 HC STEP DOWN PRIVATE ROOM

## 2022-02-26 PROCEDURE — 99223 PR INITIAL HOSPITAL CARE,LEVL III: ICD-10-PCS | Mod: ,,, | Performed by: INTERNAL MEDICINE

## 2022-02-26 PROCEDURE — 83605 ASSAY OF LACTIC ACID: CPT | Performed by: STUDENT IN AN ORGANIZED HEALTH CARE EDUCATION/TRAINING PROGRAM

## 2022-02-26 PROCEDURE — 99233 PR SUBSEQUENT HOSPITAL CARE,LEVL III: ICD-10-PCS | Mod: ,,, | Performed by: STUDENT IN AN ORGANIZED HEALTH CARE EDUCATION/TRAINING PROGRAM

## 2022-02-26 PROCEDURE — 36415 COLL VENOUS BLD VENIPUNCTURE: CPT | Performed by: STUDENT IN AN ORGANIZED HEALTH CARE EDUCATION/TRAINING PROGRAM

## 2022-02-26 PROCEDURE — 25000003 PHARM REV CODE 250: Performed by: STUDENT IN AN ORGANIZED HEALTH CARE EDUCATION/TRAINING PROGRAM

## 2022-02-26 RX ORDER — METHOCARBAMOL 500 MG/1
500 TABLET, FILM COATED ORAL ONCE
Status: COMPLETED | OUTPATIENT
Start: 2022-02-27 | End: 2022-02-27

## 2022-02-26 RX ORDER — ONDANSETRON 4 MG/1
4 TABLET, ORALLY DISINTEGRATING ORAL ONCE
Status: COMPLETED | OUTPATIENT
Start: 2022-02-26 | End: 2022-02-26

## 2022-02-26 RX ADMIN — ONDANSETRON 4 MG: 4 TABLET, ORALLY DISINTEGRATING ORAL at 11:02

## 2022-02-26 RX ADMIN — LORAZEPAM 1 MG: 2 INJECTION INTRAMUSCULAR; INTRAVENOUS at 09:02

## 2022-02-26 RX ADMIN — LORAZEPAM 1 MG: 2 INJECTION INTRAMUSCULAR; INTRAVENOUS at 01:02

## 2022-02-26 RX ADMIN — LORAZEPAM 1 MG: 2 INJECTION INTRAMUSCULAR; INTRAVENOUS at 05:02

## 2022-02-26 RX ADMIN — SODIUM PHOSPHATE, MONOBASIC, MONOHYDRATE 39.99 MMOL: 276; 142 INJECTION, SOLUTION INTRAVENOUS at 11:02

## 2022-02-26 RX ADMIN — LORAZEPAM 1 MG: 2 INJECTION INTRAMUSCULAR; INTRAVENOUS at 12:02

## 2022-02-26 NOTE — NURSING
PATIENT WOKE UP AROUND 5 AM AND WAS A LITTLE ANXIOUS. PATIENT VOMITED X 1. PRN ATIVAN ORDERED & WILL BE GIVEN TO PATIENT WHEN MEDICATION IS DO.

## 2022-02-26 NOTE — SUBJECTIVE & OBJECTIVE
Interval History: NAEON. Continued vomiting. Denies CP. +blood in stool    Review of Systems   Constitutional:  Negative for activity change, chills, fever and unexpected weight change.   HENT:  Negative for congestion and sore throat.    Eyes:  Negative for photophobia and visual disturbance.   Respiratory:  Negative for cough, shortness of breath and wheezing.    Cardiovascular:  Positive for chest pain. Negative for palpitations and leg swelling.   Gastrointestinal:  Positive for abdominal pain, blood in stool, nausea and vomiting.   Endocrine: Negative for polydipsia and polyuria.   Genitourinary:  Negative for dysuria and hematuria.   Musculoskeletal:  Negative for arthralgias and neck pain.   Skin:  Negative for color change and rash.   Neurological:  Negative for dizziness, seizures and numbness.   Psychiatric/Behavioral:  Positive for agitation. Negative for hallucinations and suicidal ideas.    Objective:     Vital Signs (Most Recent):  Temp: 97.5 °F (36.4 °C) (02/26/22 0730)  Pulse: 77 (02/26/22 0736)  Resp: 16 (02/26/22 0730)  BP: 125/73 (02/26/22 0730)  SpO2: 95 % (02/26/22 0730)   Vital Signs (24h Range):  Temp:  [97.5 °F (36.4 °C)-98.3 °F (36.8 °C)] 97.5 °F (36.4 °C)  Pulse:  [] 77  Resp:  [16-21] 16  SpO2:  [95 %-100 %] 95 %  BP: (125-163)/() 125/73     Weight: 93.9 kg (207 lb)  Body mass index is 27.31 kg/m².    Physical Exam  Vitals reviewed.   Constitutional:       General: He is not in acute distress.     Appearance: He is well-developed. He is not diaphoretic.   HENT:      Head: Normocephalic and atraumatic.   Eyes:      General: No scleral icterus.     Pupils: Pupils are equal, round, and reactive to light.   Neck:      Thyroid: No thyromegaly.   Cardiovascular:      Rate and Rhythm: Normal rate and regular rhythm.      Heart sounds: No murmur heard.    No gallop.   Pulmonary:      Effort: Pulmonary effort is normal.      Breath sounds: Normal breath sounds. No stridor. No wheezing  or rales.   Abdominal:      General: There is no distension.      Palpations: Abdomen is soft.      Tenderness: There is no abdominal tenderness.   Musculoskeletal:         General: No swelling or deformity. Normal range of motion.      Cervical back: Normal range of motion. No rigidity.   Lymphadenopathy:      Cervical: No cervical adenopathy.   Skin:     General: Skin is warm.      Capillary Refill: Capillary refill takes less than 2 seconds.   Neurological:      General: No focal deficit present.      Mental Status: He is alert and oriented to person, place, and time.      Cranial Nerves: No cranial nerve deficit.      Motor: No weakness.   Psychiatric:         Mood and Affect: Mood normal.         Behavior: Behavior normal.         CRANIAL NERVES     CN III, IV, VI   Pupils are equal, round, and reactive to light.       Recent Results (from the past 24 hour(s))   CBC auto differential    Collection Time: 02/25/22  7:34 PM   Result Value Ref Range    WBC 12.46 3.90 - 12.70 K/uL    RBC 4.67 4.60 - 6.20 M/uL    Hemoglobin 13.7 (L) 14.0 - 18.0 g/dL    Hematocrit 42.2 40.0 - 54.0 %    MCV 90 82 - 98 fL    MCH 29.3 27.0 - 31.0 pg    MCHC 32.5 32.0 - 36.0 g/dL    RDW 12.5 11.5 - 14.5 %    Platelets 362 150 - 450 K/uL    MPV 10.8 9.2 - 12.9 fL    Immature Granulocytes 1.7 (H) 0.0 - 0.5 %    Gran # (ANC) 9.0 (H) 1.8 - 7.7 K/uL    Immature Grans (Abs) 0.21 (H) 0.00 - 0.04 K/uL    Lymph # 2.1 1.0 - 4.8 K/uL    Mono # 1.0 0.3 - 1.0 K/uL    Eos # 0.1 0.0 - 0.5 K/uL    Baso # 0.05 0.00 - 0.20 K/uL    nRBC 0 0 /100 WBC    Gran % 72.4 38.0 - 73.0 %    Lymph % 17.1 (L) 18.0 - 48.0 %    Mono % 7.9 4.0 - 15.0 %    Eosinophil % 0.5 0.0 - 8.0 %    Basophil % 0.4 0.0 - 1.9 %    Differential Method Automated    Basic metabolic panel    Collection Time: 02/25/22  7:34 PM   Result Value Ref Range    Sodium 134 (L) 136 - 145 mmol/L    Potassium 3.2 (L) 3.5 - 5.1 mmol/L    Chloride 95 95 - 110 mmol/L    CO2 30 (H) 23 - 29 mmol/L     Glucose 92 70 - 110 mg/dL    BUN 10 6 - 20 mg/dL    Creatinine 0.8 0.5 - 1.4 mg/dL    Calcium 8.8 8.7 - 10.5 mg/dL    Anion Gap 9 8 - 16 mmol/L    eGFR if African American >60.0 >60 mL/min/1.73 m^2    eGFR if non African American >60.0 >60 mL/min/1.73 m^2   Protime-INR    Collection Time: 02/25/22  7:34 PM   Result Value Ref Range    Prothrombin Time 11.0 9.0 - 12.5 sec    INR 1.1 0.8 - 1.2   CBC auto differential    Collection Time: 02/26/22  4:49 AM   Result Value Ref Range    WBC 14.85 (H) 3.90 - 12.70 K/uL    RBC 4.67 4.60 - 6.20 M/uL    Hemoglobin 13.7 (L) 14.0 - 18.0 g/dL    Hematocrit 42.6 40.0 - 54.0 %    MCV 91 82 - 98 fL    MCH 29.3 27.0 - 31.0 pg    MCHC 32.2 32.0 - 36.0 g/dL    RDW 12.3 11.5 - 14.5 %    Platelets 325 150 - 450 K/uL    MPV 10.6 9.2 - 12.9 fL    Immature Granulocytes 1.4 (H) 0.0 - 0.5 %    Gran # (ANC) 9.6 (H) 1.8 - 7.7 K/uL    Immature Grans (Abs) 0.21 (H) 0.00 - 0.04 K/uL    Lymph # 3.4 1.0 - 4.8 K/uL    Mono # 1.3 (H) 0.3 - 1.0 K/uL    Eos # 0.2 0.0 - 0.5 K/uL    Baso # 0.05 0.00 - 0.20 K/uL    nRBC 0 0 /100 WBC    Gran % 64.9 38.0 - 73.0 %    Lymph % 23.2 18.0 - 48.0 %    Mono % 9.0 4.0 - 15.0 %    Eosinophil % 1.2 0.0 - 8.0 %    Basophil % 0.3 0.0 - 1.9 %    Differential Method Automated        Microbiology Results (last 7 days)       ** No results found for the last 168 hours. **             Imaging Results              US Abdomen Limited (Final result)  Result time 02/23/22 16:36:06      Final result by Matt Velazquez MD (02/23/22 16:36:06)                   Impression:      1.  No significant abnormalities are identified.    Electronically signed by resident: Oswald Peck  Date:    02/23/2022  Time:    16:24    Electronically signed by: Matt Velazquez MD  Date:    02/23/2022  Time:    16:36               Narrative:    EXAMINATION:  US ABDOMEN LIMITED    CLINICAL HISTORY:  RUQ tenderness and leukocytosis and vomiting.  Please do RUQ US;    TECHNIQUE:  Complete abdominal  ultrasound was performed.    COMPARISON:  Abdominal radiograph 02/23/2022, ultrasound abdomen limited 06/17/2021    FINDINGS:  The visualized pancreas is unremarkable.    There is no intra or extrahepatic bile duct dilatation.  The common duct measures 2 mm.    The gallbladder is unremarkable with no evidence of wall thickening, pericholecystic fluid, sonographic Montano's sign, or cholelithiasis.    There is no free fluid within the visualized abdomen.                                       US Abdomen Limited (Final result)  Result time 02/23/22 16:35:41      Final result by Matt Velazquez MD (02/23/22 16:35:41)                   Impression:      Appendix is not visualized, with no secondary findings to support a diagnosis of appendicitis.    Electronically signed by resident: Oswald Peck  Date:    02/23/2022  Time:    16:27    Electronically signed by: Matt Velazquez MD  Date:    02/23/2022  Time:    16:35               Narrative:    EXAMINATION:  US ABDOMEN LIMITED    CLINICAL HISTORY:  RLQ tenderness, concern for appendicitis;    TECHNIQUE:  Limited ultrasound of the right lower quadrant of the abdomen was performed with graded compression in the expected location of the appendix.    COMPARISON:  Abdominal radiograph 02/23/2022, ultrasound abdomen limited 06/17/2021    FINDINGS:  Appendix:    Visualized: No    Diameter (with compression): N/A    Compressibility: N/A    Vascularity: N/A    Tali-Appendiceal Fat Infiltration: N/A    Tali-Appendiceal Fluid: N/A    Right Lower Quadrant Pain:    Tenderness with Compression: Mild    Rebound Tenderness: Mild    Miscellaneous: No ascites. No lymphadenopathy.                                       X-Ray Abdomen Flat And Erect (Final result)  Result time 02/23/22 14:18:30      Final result by Devin Michel III, MD (02/23/22 14:18:30)                   Narrative:    EXAMINATION:  XR ABDOMEN FLAT AND ERECT    CLINICAL HISTORY:  Vomiting,  unspecified    FINDINGS:  Abdomen flat erect: No obstruction, ileus, or perforation seen.      Electronically signed by: Devin Michel MD  Date:    02/23/2022  Time:    14:18                                     X-Ray Chest PA And Lateral (Final result)  Result time 02/23/22 14:18:47      Final result by Devin Michel III, MD (02/23/22 14:18:47)                   Impression:      No acute process seen.      Electronically signed by: Devin Michel MD  Date:    02/23/2022  Time:    14:18               Narrative:    EXAMINATION:  XR CHEST PA AND LATERAL    CLINICAL HISTORY:  Shortness of breath    FINDINGS:  Heart size is normal.  Lungs are clear and the bones show nothing unusual.

## 2022-02-26 NOTE — PROGRESS NOTES
[facility] Medicine  Progress Note    Patient Name: Joel Coleman  MRN: [id number]  Patient Class: IP- Inpatient   Admission Date: 2/23/2022  Length of Stay: 3 days  Attending Physician: Will Whyte MD  Primary Care Provider: Primary Doctor No        Subjective:     Principal Problem:Intractable cyclical vomiting with nausea        HPI:  22 yo M with PMHx of cyclical vomiting syndrome and anxiety who presents to the ED complaining of intracable nausea and vomiting for the last 5-6 days. Pt. Reports scar russell has been unable to tolerate almost any PO intake due to nasuea, vomiting and associated chest pain. He states that the symptoms have progressively worsening, especially his chest pain and SOB over the last day. Pt. Reports he is worried he is dehydrated and he feels like he is having intermittent palpitations. He reports having prior similar symptoms in the past, but he thinks the chest pain is more severe this time. No reported hematemesis, no fevers, chills, cough, or diarrhea. Continued vomiting today. Qtc starting to come down, K nearly replete. Pt reported blood instool this evening ~7pm, was straining during day so potentially hemorrhoid or splitting from constipation, will recheck Hb, make NPO at mn, and consult GI for tomorrow morning.       Overview/Hospital Course:  Still with significant vomiting, but with Qtc ~600 so avoiding anti-nausea meds. Patient has had extensive workup's over the years for cyclic vomiting, encountered at a very young age prior to age of smoking marijuana. Will concentrate on symptomatic tx and electrolyte and fluid replacement. Had mild troponinemia but EHCO with no wall motion abnormalitites and valves looked normal with good EF, likely demand/stress/electrolyte derangement. Mild leukocytosis fluctuating, likely also from vomiting or mild hematochezia rather than infection, afebrile throughout. GI consulted given hematochezia and a/c cyclic  vomiting. Hb 13's, baseline is 17's, may have some dilutional effect with fluids however.       Interval History: NAEON. Continued vomiting. Denies CP. +blood in stool    Review of Systems   Constitutional:  Negative for activity change, chills, fever and unexpected weight change.   HENT:  Negative for congestion and sore throat.    Eyes:  Negative for photophobia and visual disturbance.   Respiratory:  Negative for cough, shortness of breath and wheezing.    Cardiovascular:  Positive for chest pain. Negative for palpitations and leg swelling.   Gastrointestinal:  Positive for abdominal pain, blood in stool, nausea and vomiting.   Endocrine: Negative for polydipsia and polyuria.   Genitourinary:  Negative for dysuria and hematuria.   Musculoskeletal:  Negative for arthralgias and neck pain.   Skin:  Negative for color change and rash.   Neurological:  Negative for dizziness, seizures and numbness.   Psychiatric/Behavioral:  Positive for agitation. Negative for hallucinations and suicidal ideas.    Objective:     Vital Signs (Most Recent):  Temp: 97.5 °F (36.4 °C) (02/26/22 0730)  Pulse: 77 (02/26/22 0736)  Resp: 16 (02/26/22 0730)  BP: 125/73 (02/26/22 0730)  SpO2: 95 % (02/26/22 0730)   Vital Signs (24h Range):  Temp:  [97.5 °F (36.4 °C)-98.3 °F (36.8 °C)] 97.5 °F (36.4 °C)  Pulse:  [] 77  Resp:  [16-21] 16  SpO2:  [95 %-100 %] 95 %  BP: (125-163)/() 125/73     Weight: 93.9 kg (207 lb)  Body mass index is 27.31 kg/m².    Physical Exam  Vitals reviewed.   Constitutional:       General: He is not in acute distress.     Appearance: He is well-developed. He is not diaphoretic.   HENT:      Head: Normocephalic and atraumatic.   Eyes:      General: No scleral icterus.     Pupils: Pupils are equal, round, and reactive to light.   Neck:      Thyroid: No thyromegaly.   Cardiovascular:      Rate and Rhythm: Normal rate and regular rhythm.      Heart sounds: No murmur heard.    No gallop.   Pulmonary:      Effort:  Pulmonary effort is normal.      Breath sounds: Normal breath sounds. No stridor. No wheezing or rales.   Abdominal:      General: There is no distension.      Palpations: Abdomen is soft.      Tenderness: There is no abdominal tenderness.   Musculoskeletal:         General: No swelling or deformity. Normal range of motion.      Cervical back: Normal range of motion. No rigidity.   Lymphadenopathy:      Cervical: No cervical adenopathy.   Skin:     General: Skin is warm.      Capillary Refill: Capillary refill takes less than 2 seconds.   Neurological:      General: No focal deficit present.      Mental Status: He is alert and oriented to person, place, and time.      Cranial Nerves: No cranial nerve deficit.      Motor: No weakness.   Psychiatric:         Mood and Affect: Mood normal.         Behavior: Behavior normal.         CRANIAL NERVES     CN III, IV, VI   Pupils are equal, round, and reactive to light.       Recent Results (from the past 24 hour(s))   CBC auto differential    Collection Time: 02/25/22  7:34 PM   Result Value Ref Range    WBC 12.46 3.90 - 12.70 K/uL    RBC 4.67 4.60 - 6.20 M/uL    Hemoglobin 13.7 (L) 14.0 - 18.0 g/dL    Hematocrit 42.2 40.0 - 54.0 %    MCV 90 82 - 98 fL    MCH 29.3 27.0 - 31.0 pg    MCHC 32.5 32.0 - 36.0 g/dL    RDW 12.5 11.5 - 14.5 %    Platelets 362 150 - 450 K/uL    MPV 10.8 9.2 - 12.9 fL    Immature Granulocytes 1.7 (H) 0.0 - 0.5 %    Gran # (ANC) 9.0 (H) 1.8 - 7.7 K/uL    Immature Grans (Abs) 0.21 (H) 0.00 - 0.04 K/uL    Lymph # 2.1 1.0 - 4.8 K/uL    Mono # 1.0 0.3 - 1.0 K/uL    Eos # 0.1 0.0 - 0.5 K/uL    Baso # 0.05 0.00 - 0.20 K/uL    nRBC 0 0 /100 WBC    Gran % 72.4 38.0 - 73.0 %    Lymph % 17.1 (L) 18.0 - 48.0 %    Mono % 7.9 4.0 - 15.0 %    Eosinophil % 0.5 0.0 - 8.0 %    Basophil % 0.4 0.0 - 1.9 %    Differential Method Automated    Basic metabolic panel    Collection Time: 02/25/22  7:34 PM   Result Value Ref Range    Sodium 134 (L) 136 - 145 mmol/L    Potassium  3.2 (L) 3.5 - 5.1 mmol/L    Chloride 95 95 - 110 mmol/L    CO2 30 (H) 23 - 29 mmol/L    Glucose 92 70 - 110 mg/dL    BUN 10 6 - 20 mg/dL    Creatinine 0.8 0.5 - 1.4 mg/dL    Calcium 8.8 8.7 - 10.5 mg/dL    Anion Gap 9 8 - 16 mmol/L    eGFR if African American >60.0 >60 mL/min/1.73 m^2    eGFR if non African American >60.0 >60 mL/min/1.73 m^2   Protime-INR    Collection Time: 02/25/22  7:34 PM   Result Value Ref Range    Prothrombin Time 11.0 9.0 - 12.5 sec    INR 1.1 0.8 - 1.2   CBC auto differential    Collection Time: 02/26/22  4:49 AM   Result Value Ref Range    WBC 14.85 (H) 3.90 - 12.70 K/uL    RBC 4.67 4.60 - 6.20 M/uL    Hemoglobin 13.7 (L) 14.0 - 18.0 g/dL    Hematocrit 42.6 40.0 - 54.0 %    MCV 91 82 - 98 fL    MCH 29.3 27.0 - 31.0 pg    MCHC 32.2 32.0 - 36.0 g/dL    RDW 12.3 11.5 - 14.5 %    Platelets 325 150 - 450 K/uL    MPV 10.6 9.2 - 12.9 fL    Immature Granulocytes 1.4 (H) 0.0 - 0.5 %    Gran # (ANC) 9.6 (H) 1.8 - 7.7 K/uL    Immature Grans (Abs) 0.21 (H) 0.00 - 0.04 K/uL    Lymph # 3.4 1.0 - 4.8 K/uL    Mono # 1.3 (H) 0.3 - 1.0 K/uL    Eos # 0.2 0.0 - 0.5 K/uL    Baso # 0.05 0.00 - 0.20 K/uL    nRBC 0 0 /100 WBC    Gran % 64.9 38.0 - 73.0 %    Lymph % 23.2 18.0 - 48.0 %    Mono % 9.0 4.0 - 15.0 %    Eosinophil % 1.2 0.0 - 8.0 %    Basophil % 0.3 0.0 - 1.9 %    Differential Method Automated        Microbiology Results (last 7 days)       ** No results found for the last 168 hours. **             Imaging Results              US Abdomen Limited (Final result)  Result time 02/23/22 16:36:06      Final result by Matt Velazquez MD (02/23/22 16:36:06)                   Impression:      1.  No significant abnormalities are identified.    Electronically signed by resident: Oswald Peck  Date:    02/23/2022  Time:    16:24    Electronically signed by: Matt Velazquez MD  Date:    02/23/2022  Time:    16:36               Narrative:    EXAMINATION:  US ABDOMEN LIMITED    CLINICAL HISTORY:  RUQ tenderness and  leukocytosis and vomiting.  Please do RUQ US;    TECHNIQUE:  Complete abdominal ultrasound was performed.    COMPARISON:  Abdominal radiograph 02/23/2022, ultrasound abdomen limited 06/17/2021    FINDINGS:  The visualized pancreas is unremarkable.    There is no intra or extrahepatic bile duct dilatation.  The common duct measures 2 mm.    The gallbladder is unremarkable with no evidence of wall thickening, pericholecystic fluid, sonographic Montano's sign, or cholelithiasis.    There is no free fluid within the visualized abdomen.                                       US Abdomen Limited (Final result)  Result time 02/23/22 16:35:41      Final result by Matt Velazquez MD (02/23/22 16:35:41)                   Impression:      Appendix is not visualized, with no secondary findings to support a diagnosis of appendicitis.    Electronically signed by resident: Oswald Peck  Date:    02/23/2022  Time:    16:27    Electronically signed by: Matt Velazquez MD  Date:    02/23/2022  Time:    16:35               Narrative:    EXAMINATION:  US ABDOMEN LIMITED    CLINICAL HISTORY:  RLQ tenderness, concern for appendicitis;    TECHNIQUE:  Limited ultrasound of the right lower quadrant of the abdomen was performed with graded compression in the expected location of the appendix.    COMPARISON:  Abdominal radiograph 02/23/2022, ultrasound abdomen limited 06/17/2021    FINDINGS:  Appendix:    Visualized: No    Diameter (with compression): N/A    Compressibility: N/A    Vascularity: N/A    Tali-Appendiceal Fat Infiltration: N/A    Tali-Appendiceal Fluid: N/A    Right Lower Quadrant Pain:    Tenderness with Compression: Mild    Rebound Tenderness: Mild    Miscellaneous: No ascites. No lymphadenopathy.                                       X-Ray Abdomen Flat And Erect (Final result)  Result time 02/23/22 14:18:30      Final result by Devin Michel III, MD (02/23/22 14:18:30)                   Narrative:    EXAMINATION:  XR  ABDOMEN FLAT AND ERECT    CLINICAL HISTORY:  Vomiting, unspecified    FINDINGS:  Abdomen flat erect: No obstruction, ileus, or perforation seen.      Electronically signed by: Devin Michel MD  Date:    02/23/2022  Time:    14:18                                     X-Ray Chest PA And Lateral (Final result)  Result time 02/23/22 14:18:47      Final result by Devin Michel III, MD (02/23/22 14:18:47)                   Impression:      No acute process seen.      Electronically signed by: Devin Michel MD  Date:    02/23/2022  Time:    14:18               Narrative:    EXAMINATION:  XR CHEST PA AND LATERAL    CLINICAL HISTORY:  Shortness of breath    FINDINGS:  Heart size is normal.  Lungs are clear and the bones show nothing unusual.                                          Assessment/Plan:      * Intractable cyclical vomiting with nausea  -Pt. With intractable N/V, profound electrolyte disturbances with severe hypokalemia, alkalaosis and hypochloremia  -IV fluids, electrollyte replacement with IV KCl hourly and NS  -Pt. With prolonged QT, will avoid zofran, phenergan and other prolonging medications. Pt. Had good response with IV ativan PRn which worked during previous admission        Blood in stool  Was told through nurse this evening that he had blood in his stool this evening.  He was straining with BM earlier that day, potentially hemorrhoid or splitting.   -Will stop lovenox  -Recheck Hb  -NPO at mn in case procedure  - GI consulted given hematochezia and a/c cyclic vomiting.        Hb 13's, baseline is 17's, may have some dilutional effect with fluids however.       Leukocytosis  Mild leukocytosis fluctuating, likely also from vomiting or mild hematochezia rather than infection, afebrile throughout and no granulocyte shift.   -Will hold off on abx      Hypophosphatemia  2/2 vomiting and decreased intake  Ph 1.6 with prolonged Qtc  -will replace IV given prolonging and vomiting      Elevated  troponin  -Trop 0.262 to 0.340 in setting of dehydration, electrolyyte abnormalities. EKG with ST elevations in inferolateral leads. Case discussed with cardiology who believe to be 2/2 early repolarization. Recommend electrolyte repletion, continue to trend.   -Cards consulted, f/u official recommendations  - Had mild troponinemia but EHCO with no wall motion abnormalitites and valves looked normal with good EF, likely demand/stress/electrolyte derangement.     Hypokalemia  -2/2 intractable N/V, treat as above. IV fluids. BMP Q8 until improving      Prolonged Q-T interval on ECG  -QTc >550, hold all QT prolonging meds including home seroquel. Pt. Has had QT prolongation in past, continue discontinuing high dose sertraline at discharge. Home med sertraline not associated with QT prolongation, will continue  -Monitor on telmetry  -Still with significant vomiting, but with Qtc ~600 so avoiding anti-nausea meds. Patient has had extensive workup's over the years for cyclic vomiting, encountered at a very young age prior to age of smoking marijuana. Will concentrate on symptomatic tx and electrolyte and fluid replacement   -Improving with K replacement      Metabolic alkalosis  -2/2 intractable N/V, treat as above. IV fluids. BMP Q8 until improving      Anxiety    -QTc >550, hold all QT prolonging meds including home seroquel. Pt. Has had QT prolongation in past, continue discontinuing high dose sertraline at discharge. Home med sertraline not associated with QT prolongation, will continue        VTE Risk Mitigation (From admission, onward)         Ordered     Place sequential compression device  Until discontinued         02/23/22 2057     IP VTE HIGH RISK PATIENT  Once         02/23/22 2057                Discharge Planning   TI: 2/26/2022     Code Status: Full Code   Is the patient medically ready for discharge?:     Reason for patient still in hospital (select all that apply): Patient unstable, Patient trending  condition and Treatment  Discharge Plan A: Home                  Will Whyte MD  Department of Hospital Medicine   [facility] - Tele[facility] Stepdown

## 2022-02-26 NOTE — SUBJECTIVE & OBJECTIVE
Interval History: NAEON. Continued vomiting. Denies CP. +blood in stool    Review of Systems   Constitutional:  Positive for diaphoresis. Negative for activity change, chills, fever and unexpected weight change.   HENT:  Negative for congestion and sore throat.    Respiratory:  Negative for cough, shortness of breath and wheezing.    Cardiovascular:  Positive for chest pain. Negative for palpitations and leg swelling.   Gastrointestinal:  Positive for abdominal pain, blood in stool, nausea and vomiting.   Genitourinary:  Negative for dysuria and hematuria.   Musculoskeletal:  Negative for arthralgias and neck pain.   Skin:  Negative for color change and rash.   Neurological:  Negative for dizziness, seizures and numbness.   Psychiatric/Behavioral:  Positive for agitation. Negative for hallucinations and suicidal ideas.    Objective:     Vital Signs (Most Recent):  Temp: 98 °F (36.7 °C) (02/25/22 1155)  Pulse: 89 (02/25/22 1155)  Resp: (!) 21 (02/25/22 1155)  BP: (!) 163/103 (02/25/22 1155)  SpO2: 96 % (02/25/22 1155)   Vital Signs (24h Range):  Temp:  [98 °F (36.7 °C)-98.4 °F (36.9 °C)] 98 °F (36.7 °C)  Pulse:  [68-92] 89  Resp:  [16-21] 21  SpO2:  [95 %-100 %] 96 %  BP: (129-163)/() 163/103     Weight: 93.9 kg (207 lb)  Body mass index is 27.31 kg/m².    Physical Exam  Vitals reviewed.   Constitutional:       General: He is not in acute distress.     Appearance: He is well-developed. He is not diaphoretic.   HENT:      Head: Normocephalic and atraumatic.   Eyes:      General: No scleral icterus.     Pupils: Pupils are equal, round, and reactive to light.   Neck:      Thyroid: No thyromegaly.   Cardiovascular:      Rate and Rhythm: Normal rate and regular rhythm.      Heart sounds: No murmur heard.    No gallop.   Pulmonary:      Effort: Pulmonary effort is normal.      Breath sounds: Normal breath sounds. No stridor. No wheezing or rales.   Abdominal:      General: There is no distension.      Palpations:  Abdomen is soft.      Tenderness: There is no abdominal tenderness.   Musculoskeletal:         General: No swelling or deformity. Normal range of motion.      Cervical back: Normal range of motion. No rigidity.   Lymphadenopathy:      Cervical: No cervical adenopathy.   Skin:     General: Skin is warm.      Capillary Refill: Capillary refill takes less than 2 seconds.   Neurological:      General: No focal deficit present.      Mental Status: He is alert and oriented to person, place, and time.      Cranial Nerves: No cranial nerve deficit.      Motor: No weakness.   Psychiatric:         Mood and Affect: Mood normal.         Behavior: Behavior normal.         CRANIAL NERVES     CN III, IV, VI   Pupils are equal, round, and reactive to light.       Recent Results (from the past 24 hour(s))   Comprehensive Metabolic Panel    Collection Time: 02/25/22 10:29 AM   Result Value Ref Range    Sodium 134 (L) 136 - 145 mmol/L    Potassium 3.3 (L) 3.5 - 5.1 mmol/L    Chloride 93 (L) 95 - 110 mmol/L    CO2 33 (H) 23 - 29 mmol/L    Glucose 101 70 - 110 mg/dL    BUN 12 6 - 20 mg/dL    Creatinine 0.8 0.5 - 1.4 mg/dL    Calcium 8.3 (L) 8.7 - 10.5 mg/dL    Total Protein 5.5 (L) 6.0 - 8.4 g/dL    Albumin 3.5 3.5 - 5.2 g/dL    Total Bilirubin 0.9 0.1 - 1.0 mg/dL    Alkaline Phosphatase 77 55 - 135 U/L    AST 22 10 - 40 U/L    ALT 30 10 - 44 U/L    Anion Gap 8 8 - 16 mmol/L    eGFR if African American >60.0 >60 mL/min/1.73 m^2    eGFR if non African American >60.0 >60 mL/min/1.73 m^2       Microbiology Results (last 7 days)       ** No results found for the last 168 hours. **             Imaging Results              US Abdomen Limited (Final result)  Result time 02/23/22 16:36:06      Final result by Matt Velazquez MD (02/23/22 16:36:06)                   Impression:      1.  No significant abnormalities are identified.    Electronically signed by resident: Oswald Peck  Date:    02/23/2022  Time:    16:24    Electronically signed  by: Matt Velazquez MD  Date:    02/23/2022  Time:    16:36               Narrative:    EXAMINATION:  US ABDOMEN LIMITED    CLINICAL HISTORY:  RUQ tenderness and leukocytosis and vomiting.  Please do RUQ US;    TECHNIQUE:  Complete abdominal ultrasound was performed.    COMPARISON:  Abdominal radiograph 02/23/2022, ultrasound abdomen limited 06/17/2021    FINDINGS:  The visualized pancreas is unremarkable.    There is no intra or extrahepatic bile duct dilatation.  The common duct measures 2 mm.    The gallbladder is unremarkable with no evidence of wall thickening, pericholecystic fluid, sonographic Montano's sign, or cholelithiasis.    There is no free fluid within the visualized abdomen.                                       US Abdomen Limited (Final result)  Result time 02/23/22 16:35:41      Final result by Matt Velazquez MD (02/23/22 16:35:41)                   Impression:      Appendix is not visualized, with no secondary findings to support a diagnosis of appendicitis.    Electronically signed by resident: Oswald Peck  Date:    02/23/2022  Time:    16:27    Electronically signed by: Matt Velazquez MD  Date:    02/23/2022  Time:    16:35               Narrative:    EXAMINATION:  US ABDOMEN LIMITED    CLINICAL HISTORY:  RLQ tenderness, concern for appendicitis;    TECHNIQUE:  Limited ultrasound of the right lower quadrant of the abdomen was performed with graded compression in the expected location of the appendix.    COMPARISON:  Abdominal radiograph 02/23/2022, ultrasound abdomen limited 06/17/2021    FINDINGS:  Appendix:    Visualized: No    Diameter (with compression): N/A    Compressibility: N/A    Vascularity: N/A    Tali-Appendiceal Fat Infiltration: N/A    Tali-Appendiceal Fluid: N/A    Right Lower Quadrant Pain:    Tenderness with Compression: Mild    Rebound Tenderness: Mild    Miscellaneous: No ascites. No lymphadenopathy.                                       X-Ray Abdomen Flat And Erect  (Final result)  Result time 02/23/22 14:18:30      Final result by Devin Michel III, MD (02/23/22 14:18:30)                   Narrative:    EXAMINATION:  XR ABDOMEN FLAT AND ERECT    CLINICAL HISTORY:  Vomiting, unspecified    FINDINGS:  Abdomen flat erect: No obstruction, ileus, or perforation seen.      Electronically signed by: Devin Michel MD  Date:    02/23/2022  Time:    14:18                                     X-Ray Chest PA And Lateral (Final result)  Result time 02/23/22 14:18:47      Final result by Devin Michel III, MD (02/23/22 14:18:47)                   Impression:      No acute process seen.      Electronically signed by: Devin Michel MD  Date:    02/23/2022  Time:    14:18               Narrative:    EXAMINATION:  XR CHEST PA AND LATERAL    CLINICAL HISTORY:  Shortness of breath    FINDINGS:  Heart size is normal.  Lungs are clear and the bones show nothing unusual.

## 2022-02-26 NOTE — ASSESSMENT & PLAN NOTE
2/2 vomiting and decreased intake  Ph 1.6 with prolonged Qtc  -will replace IV given prolonging and vomiting

## 2022-02-26 NOTE — ASSESSMENT & PLAN NOTE
Was told through nurse this evening that he had blood in his stool this evening.  He was straining with BM earlier that day, potentially hemorrhoid or splitting.   -Will stop lovenox  -Recheck Hb  -NPO at mn in case procedure  -GI consult in am

## 2022-02-26 NOTE — PROGRESS NOTES
[facility]  Progress Note    Patient Name: Joel Coleman  MRN: [id number]  Patient Class: IP- Inpatient   Admission Date: 2/23/2022  Length of Stay: 2 days  Attending Physician: Will Whyte MD  Primary Care Provider: Primary Doctor No        Subjective:     Principal Problem:Intractable cyclical vomiting with nausea        HPI:  22 yo M with PMHx of cyclical vomiting syndrome and anxiety who presents to the ED complaining of intracable nausea and vomiting for the last 5-6 days. Pt. Reports scar tshdino has been unable to tolerate almost any PO intake due to nasuea, vomiting and associated chest pain. He states that the symptoms have progressively worsening, especially his chest pain and SOB over the last day. Pt. Reports he is worried he is dehydrated and he feels like he is having intermittent palpitations. He reports having prior similar symptoms in the past, but he thinks the chest pain is more severe this time. No reported hematemesis, no fevers, chills, cough, or diarrhea. Continued vomiting today. Qtc starting to come down, K nearly replete. Pt reported blood instool this evening ~7pm, was straining during day so potentially hemorrhoid or splitting from constipation, will recheck Hb, make NPO at mn, and consult GI for tomorrow morning.       Overview/Hospital Course:  Still with significant vomiting, but with Qtc ~600 so avoiding anti-nausea meds. Patient has had extensive workup's over the years for cyclic vomiting, encountered at a very young age prior to age of smoking marijuana. Will concentrate on symptomatic tx and electrolyte and fluid replacement       Interval History: NAEON. Continued vomiting. Denies CP. +blood in stool    Review of Systems   Constitutional:  Positive for diaphoresis. Negative for activity change, chills, fever and unexpected weight change.   HENT:  Negative for congestion and sore throat.    Respiratory:  Negative for cough, shortness of breath  and wheezing.    Cardiovascular:  Positive for chest pain. Negative for palpitations and leg swelling.   Gastrointestinal:  Positive for abdominal pain, blood in stool, nausea and vomiting.   Genitourinary:  Negative for dysuria and hematuria.   Musculoskeletal:  Negative for arthralgias and neck pain.   Skin:  Negative for color change and rash.   Neurological:  Negative for dizziness, seizures and numbness.   Psychiatric/Behavioral:  Positive for agitation. Negative for hallucinations and suicidal ideas.    Objective:     Vital Signs (Most Recent):  Temp: 98 °F (36.7 °C) (02/25/22 1155)  Pulse: 89 (02/25/22 1155)  Resp: (!) 21 (02/25/22 1155)  BP: (!) 163/103 (02/25/22 1155)  SpO2: 96 % (02/25/22 1155)   Vital Signs (24h Range):  Temp:  [98 °F (36.7 °C)-98.4 °F (36.9 °C)] 98 °F (36.7 °C)  Pulse:  [68-92] 89  Resp:  [16-21] 21  SpO2:  [95 %-100 %] 96 %  BP: (129-163)/() 163/103     Weight: 93.9 kg (207 lb)  Body mass index is 27.31 kg/m².    Physical Exam  Vitals reviewed.   Constitutional:       General: He is not in acute distress.     Appearance: He is well-developed. He is not diaphoretic.   HENT:      Head: Normocephalic and atraumatic.   Eyes:      General: No scleral icterus.     Pupils: Pupils are equal, round, and reactive to light.   Neck:      Thyroid: No thyromegaly.   Cardiovascular:      Rate and Rhythm: Normal rate and regular rhythm.      Heart sounds: No murmur heard.    No gallop.   Pulmonary:      Effort: Pulmonary effort is normal.      Breath sounds: Normal breath sounds. No stridor. No wheezing or rales.   Abdominal:      General: There is no distension.      Palpations: Abdomen is soft.      Tenderness: There is no abdominal tenderness.   Musculoskeletal:         General: No swelling or deformity. Normal range of motion.      Cervical back: Normal range of motion. No rigidity.   Lymphadenopathy:      Cervical: No cervical adenopathy.   Skin:     General: Skin is warm.      Capillary  Refill: Capillary refill takes less than 2 seconds.   Neurological:      General: No focal deficit present.      Mental Status: He is alert and oriented to person, place, and time.      Cranial Nerves: No cranial nerve deficit.      Motor: No weakness.   Psychiatric:         Mood and Affect: Mood normal.         Behavior: Behavior normal.         CRANIAL NERVES     CN III, IV, VI   Pupils are equal, round, and reactive to light.       Recent Results (from the past 24 hour(s))   Comprehensive Metabolic Panel    Collection Time: 02/25/22 10:29 AM   Result Value Ref Range    Sodium 134 (L) 136 - 145 mmol/L    Potassium 3.3 (L) 3.5 - 5.1 mmol/L    Chloride 93 (L) 95 - 110 mmol/L    CO2 33 (H) 23 - 29 mmol/L    Glucose 101 70 - 110 mg/dL    BUN 12 6 - 20 mg/dL    Creatinine 0.8 0.5 - 1.4 mg/dL    Calcium 8.3 (L) 8.7 - 10.5 mg/dL    Total Protein 5.5 (L) 6.0 - 8.4 g/dL    Albumin 3.5 3.5 - 5.2 g/dL    Total Bilirubin 0.9 0.1 - 1.0 mg/dL    Alkaline Phosphatase 77 55 - 135 U/L    AST 22 10 - 40 U/L    ALT 30 10 - 44 U/L    Anion Gap 8 8 - 16 mmol/L    eGFR if African American >60.0 >60 mL/min/1.73 m^2    eGFR if non African American >60.0 >60 mL/min/1.73 m^2       Microbiology Results (last 7 days)       ** No results found for the last 168 hours. **             Imaging Results              US Abdomen Limited (Final result)  Result time 02/23/22 16:36:06      Final result by Matt Velazquez MD (02/23/22 16:36:06)                   Impression:      1.  No significant abnormalities are identified.    Electronically signed by resident: Oswald Peck  Date:    02/23/2022  Time:    16:24    Electronically signed by: Matt Velazquez MD  Date:    02/23/2022  Time:    16:36               Narrative:    EXAMINATION:  US ABDOMEN LIMITED    CLINICAL HISTORY:  RUQ tenderness and leukocytosis and vomiting.  Please do RUQ US;    TECHNIQUE:  Complete abdominal ultrasound was performed.    COMPARISON:  Abdominal radiograph 02/23/2022,  ultrasound abdomen limited 06/17/2021    FINDINGS:  The visualized pancreas is unremarkable.    There is no intra or extrahepatic bile duct dilatation.  The common duct measures 2 mm.    The gallbladder is unremarkable with no evidence of wall thickening, pericholecystic fluid, sonographic Montano's sign, or cholelithiasis.    There is no free fluid within the visualized abdomen.                                       US Abdomen Limited (Final result)  Result time 02/23/22 16:35:41      Final result by Matt Velazquez MD (02/23/22 16:35:41)                   Impression:      Appendix is not visualized, with no secondary findings to support a diagnosis of appendicitis.    Electronically signed by resident: Oswald Peck  Date:    02/23/2022  Time:    16:27    Electronically signed by: Matt Velazquez MD  Date:    02/23/2022  Time:    16:35               Narrative:    EXAMINATION:  US ABDOMEN LIMITED    CLINICAL HISTORY:  RLQ tenderness, concern for appendicitis;    TECHNIQUE:  Limited ultrasound of the right lower quadrant of the abdomen was performed with graded compression in the expected location of the appendix.    COMPARISON:  Abdominal radiograph 02/23/2022, ultrasound abdomen limited 06/17/2021    FINDINGS:  Appendix:    Visualized: No    Diameter (with compression): N/A    Compressibility: N/A    Vascularity: N/A    Tali-Appendiceal Fat Infiltration: N/A    Tali-Appendiceal Fluid: N/A    Right Lower Quadrant Pain:    Tenderness with Compression: Mild    Rebound Tenderness: Mild    Miscellaneous: No ascites. No lymphadenopathy.                                       X-Ray Abdomen Flat And Erect (Final result)  Result time 02/23/22 14:18:30      Final result by Devin Michel III, MD (02/23/22 14:18:30)                   Narrative:    EXAMINATION:  XR ABDOMEN FLAT AND ERECT    CLINICAL HISTORY:  Vomiting, unspecified    FINDINGS:  Abdomen flat erect: No obstruction, ileus, or perforation  seen.      Electronically signed by: Devin Michel MD  Date:    02/23/2022  Time:    14:18                                     X-Ray Chest PA And Lateral (Final result)  Result time 02/23/22 14:18:47      Final result by Devin Michel III, MD (02/23/22 14:18:47)                   Impression:      No acute process seen.      Electronically signed by: Devin Michel MD  Date:    02/23/2022  Time:    14:18               Narrative:    EXAMINATION:  XR CHEST PA AND LATERAL    CLINICAL HISTORY:  Shortness of breath    FINDINGS:  Heart size is normal.  Lungs are clear and the bones show nothing unusual.                                          Assessment/Plan:      * Intractable cyclical vomiting with nausea  -Pt. With intractable N/V, profound electrolyte disturbances with severe hypokalemia, alkalaosis and hypochloremia  -IV fluids, electrollyte replacement with IV KCl hourly and NS  -Pt. With prolonged QT, will avoid zofran, phenergan and other prolonging medications. Pt. Had good response with IV ativan PRn which worked during previous admission      Blood in stool  Was told through nurse this evening that he had blood in his stool this evening.  He was straining with BM earlier that day, potentially hemorrhoid or splitting.   -Will stop lovenox  -Recheck Hb  -NPO at mn in case procedure  -GI consult in am      Elevated troponin    -Trop 0.262 in setting of dehydration, electrolyyte abnormalities. EKG with ST elevations in inferolateral leads. Case discussed with cardiology who believe to be 2/2 early repolarization. Recommend electrolyte repletion, continue to trend. TTE in am  -Cards consulted, f/u official recommendations    Hypokalemia  -2/2 intractable N/V, treat as above. IV fluids. BMP Q8 until improving      Prolonged Q-T interval on ECG  -QTc >550, hold all QT prolonging meds including home seroquel. Pt. Has had QT prolongation in past, continue discontinuing high dose sertraline at discharge. Home med  sertraline not associated with QT prolongation, will continue  -Monitor on telmetry  -Still with significant vomiting, but with Qtc ~600 so avoiding anti-nausea meds. Patient has had extensive workup's over the years for cyclic vomiting, encountered at a very young age prior to age of smoking marijuana. Will concentrate on symptomatic tx and electrolyte and fluid replacement   -Improving with K replacement      Metabolic alkalosis  -2/2 intractable N/V, treat as above. IV fluids. BMP Q8 until improving      Anxiety    -QTc >550, hold all QT prolonging meds including home seroquel. Pt. Has had QT prolongation in past, continue discontinuing high dose sertraline at discharge. Home med sertraline not associated with QT prolongation, will continue        VTE Risk Mitigation (From admission, onward)         Ordered     Place sequential compression device  Until discontinued         02/23/22 2057     IP VTE HIGH RISK PATIENT  Once         02/23/22 2057                Discharge Planning   TI: 2/26/2022     Code Status: Full Code   Is the patient medically ready for discharge?:     Reason for patient still in hospital (select all that apply): Patient trending condition and Treatment  Discharge Plan A: Home                  Will Whyte MD  Department of Hospital Medicine   [facility]

## 2022-02-26 NOTE — CONSULTS
[facility]  Gastroenterology  Consult Note    Patient Name: Joel Coleman  MRN: [id number]  Admission Date: 2/23/2022  Hospital Length of Stay: 3 days  Code Status: Full Code   Attending Provider: Will Whyte MD   Consulting Provider: Araceli Rivas MD  Primary Care Physician: Primary Doctor No  Principal Problem:Intractable cyclical vomiting with nausea    Inpatient consult to Gastroenterology  Consult performed by: Araceli Rivas MD  Consult ordered by: Will Whyte MD        Subjective:     HPI: Joel Coleman is a 21 y.o. male with history of cyclical vomiting syndrome vs marijuana hyperemesis syndrome, anxiety who presents for intractable nausea and vomiting. He has been admitted since 2/23 and his symptoms have been improving and he was able to tolerate a diet. Overnight, had a loose stool with straining and noted blood in the stool and toilet paper so GI was consulted.  The patient reports that overnight he had a bowel movement for which she had to strain, although the stool was more loose.  He noted a small amount of bright red blood in the stool and on the toilet paper.  Reported some pain with wiping.  This has never happened to him before.  We denies abdominal pain.  He has never had an EGD or colonoscopy.  Hgb 13.7, down from 14.9 yesterday, however the patient has been receiving IV fluids for electrolyte abnormalities as well.  Rectal exam with no obvious external lesions. No blood on exam.        Past Medical History:   Diagnosis Date    Abdominal migraine     Acquired pneumomediastinum     secondary to vomiting    Cyclical vomiting        History reviewed. No pertinent surgical history.    Family History   Problem Relation Age of Onset    Migraines Father     Bipolar disorder Father        Social History     Socioeconomic History    Marital status: Single   Tobacco Use    Smoking status: Current Some Day Smoker    Smokeless tobacco: Never Used   Substance and  Sexual Activity    Alcohol use: Yes    Drug use: Yes     Types: Marijuana       No current facility-administered medications on file prior to encounter.     Current Outpatient Medications on File Prior to Encounter   Medication Sig Dispense Refill    ondansetron (ZOFRAN) 4 MG tablet Take 1 tablet (4 mg total) by mouth every 8 (eight) hours as needed for Nausea. 10 tablet 0    pantoprazole (PROTONIX) 40 MG tablet Take 1 tablet (40 mg total) by mouth once daily. 30 tablet 11    promethazine (PHENERGAN) 25 MG suppository Place 1 suppository (25 mg total) rectally every 6 (six) hours as needed for Nausea. 14 suppository 0    QUEtiapine (SEROQUEL) 200 MG Tab Take 200 mg by mouth every evening.      sertraline (ZOLOFT) 100 MG tablet Take 100 mg by mouth once daily.      SUMAtriptan (IMITREX) 20 mg/actuation nasal spray Spray 1 spray (20 mg total) by Nasal route daily as needed for Migraine. 1 spray to a single nostril at first sign of migraine (nausea) 6 each 0       Review of patient's allergies indicates:  No Known Allergies    Review of Systems   Constitutional: Negative.    HENT: Negative.    Eyes: Negative.    Respiratory: Negative.    Cardiovascular: Negative.    Gastrointestinal: Positive for blood in stool, nausea and vomiting. Negative for abdominal pain and constipation.   Genitourinary: Negative.    Musculoskeletal: Negative.    Neurological: Negative.    Psychiatric/Behavioral: Negative.         Objective:     Vitals:    02/26/22 0736   BP:    Pulse: 77   Resp:    Temp:          Constitutional:  not in acute distress and well developed  HENT: Head: Normal, normocephalic, atraumatic.  Eyes: conjunctiva clear and sclera nonicteric  Cardiovascular: regular rate and rhythm  Respiratory: normal chest expansion & respiratory effort   and no accessory muscle use  GI: soft, non-tender, without masses or organomegaly  Musculoskeletal: no muscular tenderness noted  Skin: normal color  Neurological: alert,  oriented x3  Psychiatric: mood and affect are within normal limits, pt is a good historian; no memory problems were noted  Rectal: normal perianal exam, no blood on rectal exam, no obvious external lesions    Significant Labs:  Recent Labs   Lab 02/24/22  0354 02/25/22  1934 02/26/22  0449   HGB 14.9 13.7* 13.7*       Lab Results   Component Value Date    WBC 14.85 (H) 02/26/2022    HGB 13.7 (L) 02/26/2022    HCT 42.6 02/26/2022    MCV 91 02/26/2022     02/26/2022       Lab Results   Component Value Date     (L) 02/25/2022    K 3.2 (L) 02/25/2022    CL 95 02/25/2022    CO2 30 (H) 02/25/2022    BUN 10 02/25/2022    CREATININE 0.8 02/25/2022    CALCIUM 8.8 02/25/2022    ANIONGAP 9 02/25/2022    ESTGFRAFRICA >60.0 02/25/2022    EGFRNONAA >60.0 02/25/2022       Lab Results   Component Value Date    ALT 30 02/25/2022    AST 22 02/25/2022    ALKPHOS 77 02/25/2022    BILITOT 0.9 02/25/2022       Lab Results   Component Value Date    INR 1.1 02/25/2022       Significant Imaging:  Reviewed pertinent radiology findings.       Assessment/Plan:     Joel Coleman is a 21 y.o. male with history of cyclical vomiting syndrome vs marijuana hyperemesis syndrome, anxiety who presents for intractable nausea and vomiting. GI consulted for hematochezia.    Problem List:  1. Hematochezia  2. Cyclical vomiting syndrome    The patient likely had hemorrhoidal bleeding vs rectal irritiation. In a 21 year old colon cancer would be extremely unlikely. Would recommend fiber supplementation, can escalate bowel regimen to avoid straining. Can follow up outpatient fto decide whether a colonoscopy would be needed if this continues to happen and he becomes anemic.    Recommendations:  - fiber supplementation  - f/u outpatient to f/u Hgb and determine whether he would need a colonoscopy    Thank you for involving us in the care of Joel Coleman. Please call with any additional questions, concerns or changes in the patient's clinical  status. We will sign off.    Araceli Rivas MD  Gastroenterology Fellow PGY V  [facility]

## 2022-02-26 NOTE — ASSESSMENT & PLAN NOTE
-QTc >550, hold all QT prolonging meds including home seroquel. Pt. Has had QT prolongation in past, continue discontinuing high dose sertraline at discharge. Home med sertraline not associated with QT prolongation, will continue  -Monitor on telmetry  -Still with significant vomiting, but with Qtc ~600 so avoiding anti-nausea meds. Patient has had extensive workup's over the years for cyclic vomiting, encountered at a very young age prior to age of smoking marijuana. Will concentrate on symptomatic tx and electrolyte and fluid replacement   -Improving with K replacement

## 2022-02-26 NOTE — ASSESSMENT & PLAN NOTE
-Trop 0.262 to 0.340 in setting of dehydration, electrolyyte abnormalities. EKG with ST elevations in inferolateral leads. Case discussed with cardiology who believe to be 2/2 early repolarization. Recommend electrolyte repletion, continue to trend.   -Cards consulted, f/u official recommendations  - Had mild troponinemia but EHCO with no wall motion abnormalitites and valves looked normal with good EF, likely demand/stress/electrolyte derangement.

## 2022-02-26 NOTE — ASSESSMENT & PLAN NOTE
Mild leukocytosis fluctuating, likely also from vomiting or mild hematochezia rather than infection, afebrile throughout and no granulocyte shift.   -Will hold off on abx

## 2022-02-26 NOTE — PLAN OF CARE
Problem: Adult Inpatient Plan of Care  Goal: Plan of Care Review  Outcome: Ongoing, Progressing  Goal: Patient-Specific Goal (Individualized)  Outcome: Ongoing, Progressing  Goal: Absence of Hospital-Acquired Illness or Injury  Outcome: Ongoing, Progressing  Goal: Optimal Comfort and Wellbeing  Outcome: Ongoing, Progressing  Goal: Readiness for Transition of Care  Outcome: Ongoing, Progressing     Problem: Fall Injury Risk  Goal: Absence of Fall and Fall-Related Injury  Outcome: Ongoing, Progressing   END OF SHIFT NOTE:   PATIENT RESTED MOST OF THE NIGHT. PRN ATIVAN GIVEN FOR NAUSEA & ANXIETY. PATIENT VOMITED X 1. PATIENT RESTING AT THIS TIME. PATIENT TURNS SELF & AMBULATES IN ROOM. BED IN LOW POSITION. CALL LIGHT WITHIN REACH.

## 2022-02-26 NOTE — ASSESSMENT & PLAN NOTE
Was told through nurse this evening that he had blood in his stool this evening.  He was straining with BM earlier that day, potentially hemorrhoid or splitting.   -Will stop lovenox  -Recheck Hb  -NPO at mn in case procedure  - GI consulted given hematochezia and a/c cyclic vomiting.        Hb 13's, baseline is 17's, may have some dilutional effect with fluids however.

## 2022-02-27 VITALS
OXYGEN SATURATION: 99 % | BODY MASS INDEX: 27.43 KG/M2 | TEMPERATURE: 99 F | SYSTOLIC BLOOD PRESSURE: 110 MMHG | HEIGHT: 73 IN | HEART RATE: 89 BPM | DIASTOLIC BLOOD PRESSURE: 61 MMHG | WEIGHT: 207 LBS | RESPIRATION RATE: 18 BRPM

## 2022-02-27 PROCEDURE — 25000003 PHARM REV CODE 250: Performed by: PHYSICIAN ASSISTANT

## 2022-02-27 PROCEDURE — 27000221 HC OXYGEN, UP TO 24 HOURS

## 2022-02-27 PROCEDURE — 63600175 PHARM REV CODE 636 W HCPCS: Performed by: HOSPITALIST

## 2022-02-27 PROCEDURE — 99232 SBSQ HOSP IP/OBS MODERATE 35: CPT | Mod: ,,, | Performed by: STUDENT IN AN ORGANIZED HEALTH CARE EDUCATION/TRAINING PROGRAM

## 2022-02-27 PROCEDURE — 94761 N-INVAS EAR/PLS OXIMETRY MLT: CPT

## 2022-02-27 PROCEDURE — 25000003 PHARM REV CODE 250: Performed by: HOSPITALIST

## 2022-02-27 PROCEDURE — 93010 EKG 12-LEAD: ICD-10-PCS | Mod: ,,, | Performed by: INTERNAL MEDICINE

## 2022-02-27 PROCEDURE — 93005 ELECTROCARDIOGRAM TRACING: CPT

## 2022-02-27 PROCEDURE — 63600175 PHARM REV CODE 636 W HCPCS: Performed by: STUDENT IN AN ORGANIZED HEALTH CARE EDUCATION/TRAINING PROGRAM

## 2022-02-27 PROCEDURE — 99900035 HC TECH TIME PER 15 MIN (STAT)

## 2022-02-27 PROCEDURE — 93010 ELECTROCARDIOGRAM REPORT: CPT | Mod: ,,, | Performed by: INTERNAL MEDICINE

## 2022-02-27 PROCEDURE — 99232 PR SUBSEQUENT HOSPITAL CARE,LEVL II: ICD-10-PCS | Mod: ,,, | Performed by: STUDENT IN AN ORGANIZED HEALTH CARE EDUCATION/TRAINING PROGRAM

## 2022-02-27 RX ORDER — LORAZEPAM 1 MG/1
1 TABLET ORAL EVERY 4 HOURS PRN
Qty: 21 TABLET | Refills: 0 | Status: SHIPPED | OUTPATIENT
Start: 2022-02-27 | End: 2022-03-06

## 2022-02-27 RX ORDER — LORAZEPAM 1 MG/1
1 TABLET ORAL EVERY 4 HOURS PRN
Qty: 21 TABLET | Refills: 0 | Status: SHIPPED | OUTPATIENT
Start: 2022-02-27 | End: 2022-02-27 | Stop reason: SDUPTHER

## 2022-02-27 RX ORDER — ONDANSETRON 4 MG/1
4 TABLET, ORALLY DISINTEGRATING ORAL ONCE
Status: DISCONTINUED | OUTPATIENT
Start: 2022-02-27 | End: 2022-02-27 | Stop reason: HOSPADM

## 2022-02-27 RX ORDER — POTASSIUM CHLORIDE 7.45 MG/ML
10 INJECTION INTRAVENOUS
Status: DISPENSED | OUTPATIENT
Start: 2022-02-27 | End: 2022-02-27

## 2022-02-27 RX ORDER — METHOCARBAMOL 500 MG/1
500 TABLET, FILM COATED ORAL 4 TIMES DAILY PRN
Qty: 40 TABLET | Refills: 0 | Status: SHIPPED | OUTPATIENT
Start: 2022-02-27 | End: 2022-03-09

## 2022-02-27 RX ADMIN — Medication 6 MG: at 12:02

## 2022-02-27 RX ADMIN — POTASSIUM CHLORIDE 10 MEQ: 7.46 INJECTION, SOLUTION INTRAVENOUS at 12:02

## 2022-02-27 RX ADMIN — LORAZEPAM 1 MG: 2 INJECTION INTRAMUSCULAR; INTRAVENOUS at 12:02

## 2022-02-27 RX ADMIN — POTASSIUM CHLORIDE 10 MEQ: 7.46 INJECTION, SOLUTION INTRAVENOUS at 10:02

## 2022-02-27 RX ADMIN — METHOCARBAMOL 500 MG: 500 TABLET ORAL at 12:02

## 2022-02-27 RX ADMIN — SERTRALINE HYDROCHLORIDE 100 MG: 100 TABLET, FILM COATED ORAL at 08:02

## 2022-02-27 RX ADMIN — LORAZEPAM 1 MG: 2 INJECTION INTRAMUSCULAR; INTRAVENOUS at 03:02

## 2022-02-27 RX ADMIN — POTASSIUM CHLORIDE 10 MEQ: 7.46 INJECTION, SOLUTION INTRAVENOUS at 08:02

## 2022-02-27 RX ADMIN — LORAZEPAM 1 MG: 2 INJECTION INTRAMUSCULAR; INTRAVENOUS at 07:02

## 2022-02-27 NOTE — ASSESSMENT & PLAN NOTE
Was told through nurse this evening that he had blood in his stool this evening.  He was straining with BM earlier that day, potentially hemorrhoid or splitting.   -Will stop lovenox  -Recheck Hb  -NPO at mn in case procedure  - GI consulted given hematochezia and a/c cyclic vomiting.        Hb 13's, baseline is 17's, may have some dilutional effect with fluids however.   -F/u with GI outpt, increase fiber

## 2022-02-27 NOTE — PLAN OF CARE
Problem: Adult Inpatient Plan of Care  Goal: Plan of Care Review  Outcome: Adequate for Care Transition  Goal: Patient-Specific Goal (Individualized)  Outcome: Adequate for Care Transition  Goal: Absence of Hospital-Acquired Illness or Injury  Outcome: Adequate for Care Transition  Goal: Optimal Comfort and Wellbeing  Outcome: Adequate for Care Transition  Goal: Readiness for Transition of Care  Outcome: Adequate for Care Transition     Problem: Fall Injury Risk  Goal: Absence of Fall and Fall-Related Injury  Outcome: Adequate for Care Transition

## 2022-02-27 NOTE — CARE UPDATE
02/27/22 0835   Aerosol Therapy   $ Aerosol Therapy Charges (S)  PRN treatment not required  (per pts mother, pt had an anxiety attack, started coughing and pt felt like he wanted to vomit.  Mother stated she will get pt some tea with honey.  pt stated that he felt better, and was stable from a RT standpoint.)

## 2022-02-27 NOTE — CARE UPDATE
Today's Date: 02/27/2022   Patient Name: Joel Coleman   YOB: 2000         Hospital Medicine Dept.        [facility] [address] [place] [address]  [phone] after hours  [phone] fax Joel Coleman has been hospitalized at the [facility], fell ill on 2/18/22 admitted on 2/23/2022 and discharged on 02/27/2022. Patient may return on 3/2/22 without restrictions. Kindly excuse the patient from school duties during his illness, hospital stay & recovery [2/18/22-3/2/22].        Please contact us, if you have any questions about the length of stay. Details of the patient's medical history will remain confidential, however.            ________________________  Will Whyte MD  Date Signed: 2/27/2022

## 2022-02-27 NOTE — NURSING
Pt and mother educated on discharge instructions. Pt and mother verbalized understanding. IV removed. Pt discharged.

## 2022-02-27 NOTE — SUBJECTIVE & OBJECTIVE
Interval History: NAEON. Continued vomiting, but improving, advancing diet. Denies CP.    Review of Systems   Constitutional:  Negative for activity change, chills, fever and unexpected weight change.   HENT:  Negative for congestion and sore throat.    Eyes:  Negative for photophobia and visual disturbance.   Respiratory:  Negative for cough, shortness of breath and wheezing.    Cardiovascular:  Positive for chest pain. Negative for palpitations and leg swelling.   Gastrointestinal:  Positive for abdominal pain, blood in stool, nausea and vomiting.   Endocrine: Negative for polydipsia and polyuria.   Genitourinary:  Negative for dysuria and hematuria.   Musculoskeletal:  Negative for arthralgias and neck pain.   Skin:  Negative for color change and rash.   Neurological:  Negative for dizziness, seizures and numbness.   Psychiatric/Behavioral:  Positive for agitation. Negative for hallucinations and suicidal ideas.    Objective:     Vital Signs (Most Recent):  Temp: 98 °F (36.7 °C) (02/27/22 0752)  Pulse: 89 (02/27/22 0835)  Resp: 18 (02/27/22 0835)  BP: 133/61 (02/27/22 0831)  SpO2: 98 % (02/27/22 0838)   Vital Signs (24h Range):  Temp:  [97.9 °F (36.6 °C)-98.5 °F (36.9 °C)] 98 °F (36.7 °C)  Pulse:  [] 89  Resp:  [16-20] 18  SpO2:  [94 %-100 %] 98 %  BP: (106-138)/(56-81) 133/61     Weight: 93.9 kg (207 lb)  Body mass index is 27.31 kg/m².    Physical Exam  Vitals reviewed.   Constitutional:       General: He is not in acute distress.     Appearance: He is well-developed. He is not diaphoretic.   HENT:      Head: Normocephalic and atraumatic.   Eyes:      General: No scleral icterus.     Pupils: Pupils are equal, round, and reactive to light.   Neck:      Thyroid: No thyromegaly.   Cardiovascular:      Rate and Rhythm: Normal rate and regular rhythm.      Heart sounds: No murmur heard.    No gallop.   Pulmonary:      Effort: Pulmonary effort is normal.      Breath sounds: Normal breath sounds. No stridor. No  wheezing or rales.   Abdominal:      General: There is no distension.      Palpations: Abdomen is soft.      Tenderness: There is no abdominal tenderness.   Musculoskeletal:         General: No swelling or deformity. Normal range of motion.      Cervical back: Normal range of motion. No rigidity.   Lymphadenopathy:      Cervical: No cervical adenopathy.   Skin:     General: Skin is warm.      Capillary Refill: Capillary refill takes less than 2 seconds.   Neurological:      General: No focal deficit present.      Mental Status: He is alert and oriented to person, place, and time.      Cranial Nerves: No cranial nerve deficit.      Motor: No weakness.   Psychiatric:         Mood and Affect: Mood normal.         Behavior: Behavior normal.         CRANIAL NERVES     CN III, IV, VI   Pupils are equal, round, and reactive to light.       Recent Results (from the past 24 hour(s))   Procalcitonin    Collection Time: 02/26/22 12:07 PM   Result Value Ref Range    Procalcitonin 0.11 <0.25 ng/mL   Basic metabolic panel    Collection Time: 02/26/22 12:07 PM   Result Value Ref Range    Sodium 135 (L) 136 - 145 mmol/L    Potassium 3.3 (L) 3.5 - 5.1 mmol/L    Chloride 98 95 - 110 mmol/L    CO2 28 23 - 29 mmol/L    Glucose 97 70 - 110 mg/dL    BUN 7 6 - 20 mg/dL    Creatinine 0.8 0.5 - 1.4 mg/dL    Calcium 8.6 (L) 8.7 - 10.5 mg/dL    Anion Gap 9 8 - 16 mmol/L    eGFR if African American >60.0 >60 mL/min/1.73 m^2    eGFR if non African American >60.0 >60 mL/min/1.73 m^2   Lactic Acid, Plasma    Collection Time: 02/26/22 12:07 PM   Result Value Ref Range    Lactate (Lactic Acid) 0.8 0.5 - 2.2 mmol/L   Magnesium    Collection Time: 02/26/22 12:07 PM   Result Value Ref Range    Magnesium 2.0 1.6 - 2.6 mg/dL   Phosphorus    Collection Time: 02/26/22 12:07 PM   Result Value Ref Range    Phosphorus 2.5 (L) 2.7 - 4.5 mg/dL       Microbiology Results (last 7 days)       ** No results found for the last 168 hours. **             Imaging  Results              US Abdomen Limited (Final result)  Result time 02/23/22 16:36:06      Final result by Matt Velazquez MD (02/23/22 16:36:06)                   Impression:      1.  No significant abnormalities are identified.    Electronically signed by resident: Oswald Peck  Date:    02/23/2022  Time:    16:24    Electronically signed by: Matt Velazquez MD  Date:    02/23/2022  Time:    16:36               Narrative:    EXAMINATION:  US ABDOMEN LIMITED    CLINICAL HISTORY:  RUQ tenderness and leukocytosis and vomiting.  Please do RUQ US;    TECHNIQUE:  Complete abdominal ultrasound was performed.    COMPARISON:  Abdominal radiograph 02/23/2022, ultrasound abdomen limited 06/17/2021    FINDINGS:  The visualized pancreas is unremarkable.    There is no intra or extrahepatic bile duct dilatation.  The common duct measures 2 mm.    The gallbladder is unremarkable with no evidence of wall thickening, pericholecystic fluid, sonographic Montano's sign, or cholelithiasis.    There is no free fluid within the visualized abdomen.                                       US Abdomen Limited (Final result)  Result time 02/23/22 16:35:41      Final result by Matt Velazquez MD (02/23/22 16:35:41)                   Impression:      Appendix is not visualized, with no secondary findings to support a diagnosis of appendicitis.    Electronically signed by resident: Oswald Peck  Date:    02/23/2022  Time:    16:27    Electronically signed by: Matt Velazquez MD  Date:    02/23/2022  Time:    16:35               Narrative:    EXAMINATION:  US ABDOMEN LIMITED    CLINICAL HISTORY:  RLQ tenderness, concern for appendicitis;    TECHNIQUE:  Limited ultrasound of the right lower quadrant of the abdomen was performed with graded compression in the expected location of the appendix.    COMPARISON:  Abdominal radiograph 02/23/2022, ultrasound abdomen limited 06/17/2021    FINDINGS:  Appendix:    Visualized: No    Diameter (with  compression): N/A    Compressibility: N/A    Vascularity: N/A    Tali-Appendiceal Fat Infiltration: N/A    Tali-Appendiceal Fluid: N/A    Right Lower Quadrant Pain:    Tenderness with Compression: Mild    Rebound Tenderness: Mild    Miscellaneous: No ascites. No lymphadenopathy.                                       X-Ray Abdomen Flat And Erect (Final result)  Result time 02/23/22 14:18:30      Final result by Devin Michel III, MD (02/23/22 14:18:30)                   Narrative:    EXAMINATION:  XR ABDOMEN FLAT AND ERECT    CLINICAL HISTORY:  Vomiting, unspecified    FINDINGS:  Abdomen flat erect: No obstruction, ileus, or perforation seen.      Electronically signed by: Devin Michel MD  Date:    02/23/2022  Time:    14:18                                     X-Ray Chest PA And Lateral (Final result)  Result time 02/23/22 14:18:47      Final result by Devin Michel III, MD (02/23/22 14:18:47)                   Impression:      No acute process seen.      Electronically signed by: Devin Michel MD  Date:    02/23/2022  Time:    14:18               Narrative:    EXAMINATION:  XR CHEST PA AND LATERAL    CLINICAL HISTORY:  Shortness of breath    FINDINGS:  Heart size is normal.  Lungs are clear and the bones show nothing unusual.

## 2022-02-27 NOTE — PROGRESS NOTES
[facility] - Telemetry [facility] Medicine  Progress Note    Patient Name: Joel Coleman  MRN: [id number]  Patient Class: IP- Inpatient   Admission Date: 2/23/2022  Length of Stay: 4 days  Attending Physician: Will Whyte MD  Primary Care Provider: Primary Doctor No        Subjective:     Principal Problem:Intractable cyclical vomiting with nausea        HPI:  20 yo M with PMHx of cyclical vomiting syndrome and anxiety who presents to the ED complaining of intracable nausea and vomiting for the last 5-6 days. Pt. Reports scar russell has been unable to tolerate almost any PO intake due to nasuea, vomiting and associated chest pain. He states that the symptoms have progressively worsening, especially his chest pain and SOB over the last day. Pt. Reports he is worried he is dehydrated and he feels like he is having intermittent palpitations. He reports having prior similar symptoms in the past, but he thinks the chest pain is more severe this time. No reported hematemesis, no fevers, chills, cough, or diarrhea. Continued vomiting today. Qtc starting to come down, K nearly replete. Pt reported blood instool this evening ~7pm, was straining during day so potentially hemorrhoid or splitting from constipation, will recheck Hb, make NPO at [place], and consult GI for tomorrow morning.       Overview/Hospital Course:  Still with significant vomiting, but with Qtc ~600 so avoiding anti-nausea meds. Patient has had extensive workup's over the years for cyclic vomiting, encountered at a very young age prior to age of smoking marijuana. Will concentrate on symptomatic tx and electrolyte and fluid replacement. Had mild troponinemia but EHCO with no wall motion abnormalitites and valves looked normal with good EF, likely demand/stress/electrolyte derangement. Mild leukocytosis fluctuating, likely also from vomiting or mild hematochezia rather than infection, afebrile throughout. GI consulted given hematochezia and a/c cyclic  vomiting. Hb 13's, baseline is 17's, may have some dilutional effect with fluids however.       Interval History: NAEON. Continued vomiting, but improving, advancing diet. Denies CP.    Review of Systems   Constitutional:  Negative for activity change, chills, fever and unexpected weight change.   HENT:  Negative for congestion and sore throat.    Eyes:  Negative for photophobia and visual disturbance.   Respiratory:  Negative for cough, shortness of breath and wheezing.    Cardiovascular:  Positive for chest pain. Negative for palpitations and leg swelling.   Gastrointestinal:  Positive for abdominal pain, blood in stool, nausea and vomiting.   Endocrine: Negative for polydipsia and polyuria.   Genitourinary:  Negative for dysuria and hematuria.   Musculoskeletal:  Negative for arthralgias and neck pain.   Skin:  Negative for color change and rash.   Neurological:  Negative for dizziness, seizures and numbness.   Psychiatric/Behavioral:  Positive for agitation. Negative for hallucinations and suicidal ideas.    Objective:     Vital Signs (Most Recent):  Temp: 98 °F (36.7 °C) (02/27/22 0752)  Pulse: 89 (02/27/22 0835)  Resp: 18 (02/27/22 0835)  BP: 133/61 (02/27/22 0831)  SpO2: 98 % (02/27/22 0838)   Vital Signs (24h Range):  Temp:  [97.9 °F (36.6 °C)-98.5 °F (36.9 °C)] 98 °F (36.7 °C)  Pulse:  [] 89  Resp:  [16-20] 18  SpO2:  [94 %-100 %] 98 %  BP: (106-138)/(56-81) 133/61     Weight: 93.9 kg (207 lb)  Body mass index is 27.31 kg/m².    Physical Exam  Vitals reviewed.   Constitutional:       General: He is not in acute distress.     Appearance: He is well-developed. He is not diaphoretic.   HENT:      Head: Normocephalic and atraumatic.   Eyes:      General: No scleral icterus.     Pupils: Pupils are equal, round, and reactive to light.   Neck:      Thyroid: No thyromegaly.   Cardiovascular:      Rate and Rhythm: Normal rate and regular rhythm.      Heart sounds: No murmur heard.    No gallop.   Pulmonary:       Effort: Pulmonary effort is normal.      Breath sounds: Normal breath sounds. No stridor. No wheezing or rales.   Abdominal:      General: There is no distension.      Palpations: Abdomen is soft.      Tenderness: There is no abdominal tenderness.   Musculoskeletal:         General: No swelling or deformity. Normal range of motion.      Cervical back: Normal range of motion. No rigidity.   Lymphadenopathy:      Cervical: No cervical adenopathy.   Skin:     General: Skin is warm.      Capillary Refill: Capillary refill takes less than 2 seconds.   Neurological:      General: No focal deficit present.      Mental Status: He is alert and oriented to person, place, and time.      Cranial Nerves: No cranial nerve deficit.      Motor: No weakness.   Psychiatric:         Mood and Affect: Mood normal.         Behavior: Behavior normal.         CRANIAL NERVES     CN III, IV, VI   Pupils are equal, round, and reactive to light.       Recent Results (from the past 24 hour(s))   Procalcitonin    Collection Time: 02/26/22 12:07 PM   Result Value Ref Range    Procalcitonin 0.11 <0.25 ng/mL   Basic metabolic panel    Collection Time: 02/26/22 12:07 PM   Result Value Ref Range    Sodium 135 (L) 136 - 145 mmol/L    Potassium 3.3 (L) 3.5 - 5.1 mmol/L    Chloride 98 95 - 110 mmol/L    CO2 28 23 - 29 mmol/L    Glucose 97 70 - 110 mg/dL    BUN 7 6 - 20 mg/dL    Creatinine 0.8 0.5 - 1.4 mg/dL    Calcium 8.6 (L) 8.7 - 10.5 mg/dL    Anion Gap 9 8 - 16 mmol/L    eGFR if African American >60.0 >60 mL/min/1.73 m^2    eGFR if non African American >60.0 >60 mL/min/1.73 m^2   Lactic Acid, Plasma    Collection Time: 02/26/22 12:07 PM   Result Value Ref Range    Lactate (Lactic Acid) 0.8 0.5 - 2.2 mmol/L   Magnesium    Collection Time: 02/26/22 12:07 PM   Result Value Ref Range    Magnesium 2.0 1.6 - 2.6 mg/dL   Phosphorus    Collection Time: 02/26/22 12:07 PM   Result Value Ref Range    Phosphorus 2.5 (L) 2.7 - 4.5 mg/dL       Microbiology  Results (last 7 days)       ** No results found for the last 168 hours. **             Imaging Results              US Abdomen Limited (Final result)  Result time 02/23/22 16:36:06      Final result by Matt Velazquez MD (02/23/22 16:36:06)                   Impression:      1.  No significant abnormalities are identified.    Electronically signed by resident: Oswald Peck  Date:    02/23/2022  Time:    16:24    Electronically signed by: Matt Velazquez MD  Date:    02/23/2022  Time:    16:36               Narrative:    EXAMINATION:  US ABDOMEN LIMITED    CLINICAL HISTORY:  RUQ tenderness and leukocytosis and vomiting.  Please do RUQ US;    TECHNIQUE:  Complete abdominal ultrasound was performed.    COMPARISON:  Abdominal radiograph 02/23/2022, ultrasound abdomen limited 06/17/2021    FINDINGS:  The visualized pancreas is unremarkable.    There is no intra or extrahepatic bile duct dilatation.  The common duct measures 2 mm.    The gallbladder is unremarkable with no evidence of wall thickening, pericholecystic fluid, sonographic Montano's sign, or cholelithiasis.    There is no free fluid within the visualized abdomen.                                       US Abdomen Limited (Final result)  Result time 02/23/22 16:35:41      Final result by Matt Velazquez MD (02/23/22 16:35:41)                   Impression:      Appendix is not visualized, with no secondary findings to support a diagnosis of appendicitis.    Electronically signed by resident: Oswald Peck  Date:    02/23/2022  Time:    16:27    Electronically signed by: Matt Velazquez MD  Date:    02/23/2022  Time:    16:35               Narrative:    EXAMINATION:  US ABDOMEN LIMITED    CLINICAL HISTORY:  RLQ tenderness, concern for appendicitis;    TECHNIQUE:  Limited ultrasound of the right lower quadrant of the abdomen was performed with graded compression in the expected location of the appendix.    COMPARISON:  Abdominal radiograph 02/23/2022,  ultrasound abdomen limited 06/17/2021    FINDINGS:  Appendix:    Visualized: No    Diameter (with compression): N/A    Compressibility: N/A    Vascularity: N/A    Tali-Appendiceal Fat Infiltration: N/A    Tali-Appendiceal Fluid: N/A    Right Lower Quadrant Pain:    Tenderness with Compression: Mild    Rebound Tenderness: Mild    Miscellaneous: No ascites. No lymphadenopathy.                                       X-Ray Abdomen Flat And Erect (Final result)  Result time 02/23/22 14:18:30      Final result by Devin Michel III, MD (02/23/22 14:18:30)                   Narrative:    EXAMINATION:  XR ABDOMEN FLAT AND ERECT    CLINICAL HISTORY:  Vomiting, unspecified    FINDINGS:  Abdomen flat erect: No obstruction, ileus, or perforation seen.      Electronically signed by: Devin Michel MD  Date:    02/23/2022  Time:    14:18                                     X-Ray Chest PA And Lateral (Final result)  Result time 02/23/22 14:18:47      Final result by Devin Michel III, MD (02/23/22 14:18:47)                   Impression:      No acute process seen.      Electronically signed by: Devin Michel MD  Date:    02/23/2022  Time:    14:18               Narrative:    EXAMINATION:  XR CHEST PA AND LATERAL    CLINICAL HISTORY:  Shortness of breath    FINDINGS:  Heart size is normal.  Lungs are clear and the bones show nothing unusual.                                          Assessment/Plan:      * Intractable cyclical vomiting with nausea  -Pt. With intractable N/V, profound electrolyte disturbances with severe hypokalemia, alkalaosis and hypochloremia  -IV fluids, electrollyte replacement with IV KCl hourly and NS  -Pt. With prolonged QT, will avoid zofran, phenergan and other prolonging medications. Pt. Had good response with IV ativan PRn which worked during previous admission        Blood in stool  Was told through nurse this evening that he had blood in his stool this evening.  He was straining with BM earlier  that day, potentially hemorrhoid or splitting.   -Will stop lovenox  -Recheck Hb  -NPO at mn in case procedure  - GI consulted given hematochezia and a/c cyclic vomiting.        Hb 13's, baseline is 17's, may have some dilutional effect with fluids however.   -F/u with GI outpt, increase fiber      Leukocytosis  Mild leukocytosis fluctuating, likely also from vomiting or mild hematochezia rather than infection, afebrile throughout and no granulocyte shift.   -Will hold off on abx      Hypophosphatemia  2/2 vomiting and decreased intake  Ph 1.6 with prolonged Qtc  -will replace IV given prolonging and vomiting      Elevated troponin  -Trop 0.262 to 0.340 in setting of dehydration, electrolyyte abnormalities. EKG with ST elevations in inferolateral leads. Case discussed with cardiology who believe to be 2/2 early repolarization. Recommend electrolyte repletion, continue to trend.   -Cards consulted, f/u official recommendations  - Had mild troponinemia but EHCO with no wall motion abnormalitites and valves looked normal with good EF, likely demand/stress/electrolyte derangement.     Hypokalemia  -2/2 intractable N/V, treat as above. IV fluids. BMP Q8 until improving      Prolonged Q-T interval on ECG  -QTc >550, hold all QT prolonging meds including home seroquel. Pt. Has had QT prolongation in past, continue discontinuing high dose sertraline at discharge. Home med sertraline not associated with QT prolongation, will continue  -Monitor on telmetry  -Still with significant vomiting, but with Qtc ~600 so avoiding anti-nausea meds. Patient has had extensive workup's over the years for cyclic vomiting, encountered at a very young age prior to age of smoking marijuana. Will concentrate on symptomatic tx and electrolyte and fluid replacement   -Improving with K replacement      Metabolic alkalosis  -2/2 intractable N/V, treat as above. IV fluids. BMP Q8 until improving      Anxiety    -QTc >550, hold all QT prolonging meds  including home seroquel. Pt. Has had QT prolongation in past, continue discontinuing high dose sertraline at discharge. Home med sertraline not associated with QT prolongation, will continue        VTE Risk Mitigation (From admission, onward)         Ordered     Place sequential compression device  Until discontinued         02/23/22 2057     IP VTE HIGH RISK PATIENT  Once         02/23/22 2057                Discharge Planning   TI: 2/28/2022     Code Status: Full Code   Is the patient medically ready for discharge?:     Reason for patient still in hospital (select all that apply): Treatment  Discharge Plan A: Home                  Will Whyte MD  Department of Hospital Medicine   [facility] - Telemetry Stepdown

## 2022-03-02 NOTE — DISCHARGE SUMMARY
[facility] - Telemetry [facility] Medicine  Discharge Summary      Patient Name: Joel Coleman  MRN: [id number]  Patient Class: IP- Inpatient  Admission Date: 2/23/2022  Hospital Length of Stay: 4 days  Discharge Date and Time:  03/01/2022 8:00 PM  Attending Physician: Varsha att. providers found   Discharging Provider: Will Whyte MD  Primary Care Provider: Primary Doctor Varsha  [facility] Medicine Team: [facility] MED  Will Whyte MD    HPI:   22 yo M with PMHx of cyclical vomiting syndrome and anxiety who presents to the ED complaining of intracable nausea and vomiting for the last 5-6 days. Pt. Reports scar wells has been unable to tolerate almost any PO intake due to nasuea, vomiting and associated chest pain. He states that the symptoms have progressively worsening, especially his chest pain and SOB over the last day. Pt. Reports he is worried he is dehydrated and he feels like he is having intermittent palpitations. He reports having prior similar symptoms in the past, but he thinks the chest pain is more severe this time. No reported hematemesis, no fevers, chills, cough, or diarrhea. Continued vomiting today. Qtc starting to come down, K nearly replete. Pt reported blood instool this evening ~7pm, was straining during day so potentially hemorrhoid or splitting from constipation, will recheck Hb, make NPO at mn, and consult GI for tomorrow morning.       * No surgery found *      Hospital Course:   Still with significant vomiting, but with Qtc ~600 so avoiding anti-nausea meds. Patient has had extensive workup's over the years for cyclic vomiting, encountered at a very young age prior to age of smoking marijuana. Will concentrate on symptomatic tx and electrolyte and fluid replacement. Had mild troponinemia but EHCO with no wall motion abnormalitites and valves looked normal with good EF, likely demand/stress/electrolyte derangement. Mild leukocytosis fluctuating, likely also from vomiting or mild hematochezia  rather than infection, afebrile throughout. GI consulted given hematochezia and a/c cyclic vomiting. Hb 13's, baseline is 17's, may have some dilutional effect with fluids however. No further bleeding. Increase Fiber.     Able to eat and drink, no more vomiting. Gave more holistic approaches towards anxiety, positive coping mech.   Psych and PCP and GI req in [place], closer to school at [employer].   School excuse given.     Goals of Care Treatment Preferences:  Code Status: Full Code      Consults:   Consults (From admission, onward)        Status Ordering Provider     Inpatient consult to Gastroenterology  Once        Provider:  (Not yet assigned)    Completed FRANKIE DANIELSON new Assessment & Plan notes have been filed under this hospital service since the last note was generated.  Service: Hospital Medicine    Final Active Diagnoses:    Diagnosis Date Noted POA    PRINCIPAL PROBLEM:  Intractable cyclical vomiting with nausea [R11.15]  Yes    Blood in stool [K92.1] 02/25/2022 Yes    Hypophosphatemia [E83.39] 02/26/2022 Yes    Leukocytosis [D72.829] 02/26/2022 Yes    Hypokalemia [E87.6] 02/23/2022 Yes    Elevated troponin [R77.8] 02/23/2022 Yes    Prolonged Q-T interval on ECG [R94.31] 06/16/2021 Yes    Metabolic alkalosis [E87.3] 06/16/2021 Yes    Anxiety [F41.9] 09/04/2020 Yes      Problems Resolved During this Admission:       Discharged Condition: good    Disposition: Home or Self Care    Follow Up:    Patient Instructions:      Ambulatory referral/consult to Internal Medicine   Standing Status: Future   Referral Priority: Routine Referral Type: Consultation   Referral Reason: Specialty Services Required   Requested Specialty: Internal Medicine   Number of Visits Requested: 1     Ambulatory referral/consult to Gastroenterology   Standing Status: Future   Referral Priority: Routine Referral Type: Consultation   Referral Reason: Specialty Services Required   Requested Specialty: Gastroenterology    Number of Visits Requested: 1     Ambulatory referral/consult to Psychiatry   Standing Status: Future   Referral Priority: Routine Referral Type: Psychiatric   Referral Reason: Specialty Services Required   Requested Specialty: Psychiatry   Number of Visits Requested: 1       Significant Diagnostic Studies:     Recent Results (from the past 100 hour(s))   CBC auto differential    Collection Time: 02/25/22  7:34 PM   Result Value Ref Range    WBC 12.46 3.90 - 12.70 K/uL    RBC 4.67 4.60 - 6.20 M/uL    Hemoglobin 13.7 (L) 14.0 - 18.0 g/dL    Hematocrit 42.2 40.0 - 54.0 %    MCV 90 82 - 98 fL    MCH 29.3 27.0 - 31.0 pg    MCHC 32.5 32.0 - 36.0 g/dL    RDW 12.5 11.5 - 14.5 %    Platelets 362 150 - 450 K/uL    MPV 10.8 9.2 - 12.9 fL    Immature Granulocytes 1.7 (H) 0.0 - 0.5 %    Gran # (ANC) 9.0 (H) 1.8 - 7.7 K/uL    Immature Grans (Abs) 0.21 (H) 0.00 - 0.04 K/uL    Lymph # 2.1 1.0 - 4.8 K/uL    Mono # 1.0 0.3 - 1.0 K/uL    Eos # 0.1 0.0 - 0.5 K/uL    Baso # 0.05 0.00 - 0.20 K/uL    nRBC 0 0 /100 WBC    Gran % 72.4 38.0 - 73.0 %    Lymph % 17.1 (L) 18.0 - 48.0 %    Mono % 7.9 4.0 - 15.0 %    Eosinophil % 0.5 0.0 - 8.0 %    Basophil % 0.4 0.0 - 1.9 %    Differential Method Automated    Basic metabolic panel    Collection Time: 02/25/22  7:34 PM   Result Value Ref Range    Sodium 134 (L) 136 - 145 mmol/L    Potassium 3.2 (L) 3.5 - 5.1 mmol/L    Chloride 95 95 - 110 mmol/L    CO2 30 (H) 23 - 29 mmol/L    Glucose 92 70 - 110 mg/dL    BUN 10 6 - 20 mg/dL    Creatinine 0.8 0.5 - 1.4 mg/dL    Calcium 8.8 8.7 - 10.5 mg/dL    Anion Gap 9 8 - 16 mmol/L    eGFR if African American >60.0 >60 mL/min/1.73 m^2    eGFR if non African American >60.0 >60 mL/min/1.73 m^2   Protime-INR    Collection Time: 02/25/22  7:34 PM   Result Value Ref Range    Prothrombin Time 11.0 9.0 - 12.5 sec    INR 1.1 0.8 - 1.2   CBC auto differential    Collection Time: 02/26/22  4:49 AM   Result Value Ref Range    WBC 14.85 (H) 3.90 - 12.70 K/uL    RBC  4.67 4.60 - 6.20 M/uL    Hemoglobin 13.7 (L) 14.0 - 18.0 g/dL    Hematocrit 42.6 40.0 - 54.0 %    MCV 91 82 - 98 fL    MCH 29.3 27.0 - 31.0 pg    MCHC 32.2 32.0 - 36.0 g/dL    RDW 12.3 11.5 - 14.5 %    Platelets 325 150 - 450 K/uL    MPV 10.6 9.2 - 12.9 fL    Immature Granulocytes 1.4 (H) 0.0 - 0.5 %    Gran # (ANC) 9.6 (H) 1.8 - 7.7 K/uL    Immature Grans (Abs) 0.21 (H) 0.00 - 0.04 K/uL    Lymph # 3.4 1.0 - 4.8 K/uL    Mono # 1.3 (H) 0.3 - 1.0 K/uL    Eos # 0.2 0.0 - 0.5 K/uL    Baso # 0.05 0.00 - 0.20 K/uL    nRBC 0 0 /100 WBC    Gran % 64.9 38.0 - 73.0 %    Lymph % 23.2 18.0 - 48.0 %    Mono % 9.0 4.0 - 15.0 %    Eosinophil % 1.2 0.0 - 8.0 %    Basophil % 0.3 0.0 - 1.9 %    Differential Method Automated    Procalcitonin    Collection Time: 02/26/22 12:07 PM   Result Value Ref Range    Procalcitonin 0.11 <0.25 ng/mL   Basic metabolic panel    Collection Time: 02/26/22 12:07 PM   Result Value Ref Range    Sodium 135 (L) 136 - 145 mmol/L    Potassium 3.3 (L) 3.5 - 5.1 mmol/L    Chloride 98 95 - 110 mmol/L    CO2 28 23 - 29 mmol/L    Glucose 97 70 - 110 mg/dL    BUN 7 6 - 20 mg/dL    Creatinine 0.8 0.5 - 1.4 mg/dL    Calcium 8.6 (L) 8.7 - 10.5 mg/dL    Anion Gap 9 8 - 16 mmol/L    eGFR if African American >60.0 >60 mL/min/1.73 m^2    eGFR if non African American >60.0 >60 mL/min/1.73 m^2   Lactic Acid, Plasma    Collection Time: 02/26/22 12:07 PM   Result Value Ref Range    Lactate (Lactic Acid) 0.8 0.5 - 2.2 mmol/L   Magnesium    Collection Time: 02/26/22 12:07 PM   Result Value Ref Range    Magnesium 2.0 1.6 - 2.6 mg/dL   Phosphorus    Collection Time: 02/26/22 12:07 PM   Result Value Ref Range    Phosphorus 2.5 (L) 2.7 - 4.5 mg/dL       Microbiology Results (last 7 days)     ** No results found for the last 168 hours. **          Imaging Results          US Abdomen Limited (Final result)  Result time 02/23/22 16:36:06    Final result by Matt Velazquez MD (02/23/22 16:36:06)                 Impression:      1.   No significant abnormalities are identified.    Electronically signed by resident: Oswald Peck  Date:    02/23/2022  Time:    16:24    Electronically signed by: Matt Velazquez MD  Date:    02/23/2022  Time:    16:36             Narrative:    EXAMINATION:  US ABDOMEN LIMITED    CLINICAL HISTORY:  RUQ tenderness and leukocytosis and vomiting.  Please do RUQ US;    TECHNIQUE:  Complete abdominal ultrasound was performed.    COMPARISON:  Abdominal radiograph 02/23/2022, ultrasound abdomen limited 06/17/2021    FINDINGS:  The visualized pancreas is unremarkable.    There is no intra or extrahepatic bile duct dilatation.  The common duct measures 2 mm.    The gallbladder is unremarkable with no evidence of wall thickening, pericholecystic fluid, sonographic Montano's sign, or cholelithiasis.    There is no free fluid within the visualized abdomen.                               US Abdomen Limited (Final result)  Result time 02/23/22 16:35:41    Final result by Matt Velazquez MD (02/23/22 16:35:41)                 Impression:      Appendix is not visualized, with no secondary findings to support a diagnosis of appendicitis.    Electronically signed by resident: Oswald Peck  Date:    02/23/2022  Time:    16:27    Electronically signed by: Matt Velazquez MD  Date:    02/23/2022  Time:    16:35             Narrative:    EXAMINATION:  US ABDOMEN LIMITED    CLINICAL HISTORY:  RLQ tenderness, concern for appendicitis;    TECHNIQUE:  Limited ultrasound of the right lower quadrant of the abdomen was performed with graded compression in the expected location of the appendix.    COMPARISON:  Abdominal radiograph 02/23/2022, ultrasound abdomen limited 06/17/2021    FINDINGS:  Appendix:    Visualized: No    Diameter (with compression): N/A    Compressibility: N/A    Vascularity: N/A    Tali-Appendiceal Fat Infiltration: N/A    Tali-Appendiceal Fluid: N/A    Right Lower Quadrant Pain:    Tenderness with Compression:  Mild    Rebound Tenderness: Mild    Miscellaneous: No ascites. No lymphadenopathy.                               X-Ray Abdomen Flat And Erect (Final result)  Result time 02/23/22 14:18:30    Final result by Devin Michel III, MD (02/23/22 14:18:30)                 Narrative:    EXAMINATION:  XR ABDOMEN FLAT AND ERECT    CLINICAL HISTORY:  Vomiting, unspecified    FINDINGS:  Abdomen flat erect: No obstruction, ileus, or perforation seen.      Electronically signed by: Devin Michel MD  Date:    02/23/2022  Time:    14:18                             X-Ray Chest PA And Lateral (Final result)  Result time 02/23/22 14:18:47    Final result by Devin Michel III, MD (02/23/22 14:18:47)                 Impression:      No acute process seen.      Electronically signed by: Devin Michel MD  Date:    02/23/2022  Time:    14:18             Narrative:    EXAMINATION:  XR CHEST PA AND LATERAL    CLINICAL HISTORY:  Shortness of breath    FINDINGS:  Heart size is normal.  Lungs are clear and the bones show nothing unusual.                                    Pending Diagnostic Studies:     None         Medications:  Reconciled Home Medications:      Medication List      START taking these medications    LORazepam 1 MG tablet  Commonly known as: ATIVAN  Take 1 tablet (1 mg total) by mouth every 4 (four) hours as needed for Anxiety.     methocarbamoL 500 MG Tab  Commonly known as: ROBAXIN  Take 1 tablet (500 mg total) by mouth 4 (four) times daily as needed (Muscle cramps/pacing).        CONTINUE taking these medications    ondansetron 4 MG tablet  Commonly known as: ZOFRAN  Take 1 tablet (4 mg total) by mouth every 8 (eight) hours as needed for Nausea.     pantoprazole 40 MG tablet  Commonly known as: PROTONIX  Take 1 tablet (40 mg total) by mouth once daily.     promethazine 25 MG suppository  Commonly known as: PHENERGAN  Place 1 suppository (25 mg total) rectally every 6 (six) hours as needed for Nausea.     QUEtiapine  200 MG Tab  Commonly known as: SEROQUEL  Take 200 mg by mouth every evening.     sertraline 100 MG tablet  Commonly known as: ZOLOFT  Take 100 mg by mouth once daily.     SUMAtriptan 20 mg/actuation nasal spray  Commonly known as: IMITREX  Spray 1 spray (20 mg total) by Nasal route daily as needed for Migraine. 1 spray to a single nostril at first sign of migraine (nausea)            Indwelling Lines/Drains at time of discharge:   Lines/Drains/Airways     None                 Time spent on the discharge of patient: 35 minutes         Will Whyte MD  Department of Hospital Medicine  [facility] - Telemetry Stepdown